# Patient Record
Sex: FEMALE | Race: OTHER | Employment: UNEMPLOYED
[De-identification: names, ages, dates, MRNs, and addresses within clinical notes are randomized per-mention and may not be internally consistent; named-entity substitution may affect disease eponyms.]

---

## 2022-11-18 LAB — PAP SMEAR, EXTERNAL: NORMAL

## 2024-11-14 LAB
ANTIBODY: NONREACTIVE
CHOLESTEROL, TOTAL: 159 MG/DL
CHOLESTEROL/HDL RATIO: 3.9
HDLC SERPL-MCNC: 41 MG/DL (ref 35–70)
HIV 1+2 AB+HIV1P24 AG SERPLBLD IA.RAPID: NEGATIVE
LDL CHOLESTEROL: 64
NONHDLC SERPL-MCNC: 118 MG/DL
TRIGL SERPL-MCNC: 271 MG/DL
VLDLC SERPL CALC-MCNC: 54 MG/DL

## 2024-11-18 ENCOUNTER — OFFICE VISIT (OUTPATIENT)
Dept: PRIMARY CARE CLINIC | Facility: CLINIC | Age: 44
End: 2024-11-18
Payer: MEDICAID

## 2024-11-18 VITALS
DIASTOLIC BLOOD PRESSURE: 68 MMHG | HEIGHT: 65 IN | WEIGHT: 232 LBS | TEMPERATURE: 97.6 F | HEART RATE: 60 BPM | OXYGEN SATURATION: 97 % | BODY MASS INDEX: 38.65 KG/M2 | RESPIRATION RATE: 16 BRPM | SYSTOLIC BLOOD PRESSURE: 102 MMHG

## 2024-11-18 DIAGNOSIS — R53.83 FATIGUE DUE TO SLEEP PATTERN DISTURBANCE: ICD-10-CM

## 2024-11-18 DIAGNOSIS — G47.9 FATIGUE DUE TO SLEEP PATTERN DISTURBANCE: ICD-10-CM

## 2024-11-18 DIAGNOSIS — E55.9 VITAMIN D DEFICIENCY: ICD-10-CM

## 2024-11-18 DIAGNOSIS — M79.605 PAIN OF LEFT LOWER EXTREMITY: ICD-10-CM

## 2024-11-18 DIAGNOSIS — Z76.89 ENCOUNTER TO ESTABLISH CARE: Primary | ICD-10-CM

## 2024-11-18 DIAGNOSIS — R73.03 PREDIABETES: ICD-10-CM

## 2024-11-18 DIAGNOSIS — I82.402 DEEP VEIN THROMBOSIS (DVT) OF LEFT LOWER EXTREMITY, UNSPECIFIED CHRONICITY, UNSPECIFIED VEIN (HCC): ICD-10-CM

## 2024-11-18 DIAGNOSIS — K59.00 CONSTIPATION, UNSPECIFIED CONSTIPATION TYPE: ICD-10-CM

## 2024-11-18 DIAGNOSIS — E78.1 HYPERTRIGLYCERIDEMIA: ICD-10-CM

## 2024-11-18 LAB
25(OH)D3 SERPL-MCNC: 23.3 NG/ML (ref 30–100)
ALBUMIN SERPL-MCNC: 4 G/DL (ref 3.5–5)
ALBUMIN/GLOB SERPL: 1.2 (ref 1–1.9)
ALP SERPL-CCNC: 94 U/L (ref 35–104)
ALT SERPL-CCNC: 19 U/L (ref 8–45)
AST SERPL-CCNC: 22 U/L (ref 15–37)
BILIRUB DIRECT SERPL-MCNC: <0.2 MG/DL (ref 0–0.4)
BILIRUB SERPL-MCNC: <0.2 MG/DL (ref 0–1.2)
EST. AVERAGE GLUCOSE BLD GHB EST-MCNC: 118 MG/DL
GLOBULIN SER CALC-MCNC: 3.4 G/DL (ref 2.3–3.5)
HBA1C MFR BLD: 5.8 % (ref 0–5.6)
MAGNESIUM SERPL-MCNC: 2 MG/DL (ref 1.8–2.4)
PROT SERPL-MCNC: 7.3 G/DL (ref 6.3–8.2)

## 2024-11-18 PROCEDURE — 99205 OFFICE O/P NEW HI 60 MIN: CPT

## 2024-11-18 RX ORDER — PSYLLIUM HUSK 100 %
15 POWDER (GRAM) MISCELLANEOUS 2 TIMES DAILY
Qty: 900 G | Refills: 5 | Status: SHIPPED | OUTPATIENT
Start: 2024-11-18 | End: 2024-12-18

## 2024-11-18 RX ORDER — FENOFIBRATE 145 MG/1
145 TABLET, COATED ORAL DAILY
Qty: 30 TABLET | Refills: 11 | Status: SHIPPED | OUTPATIENT
Start: 2024-11-18

## 2024-11-18 SDOH — ECONOMIC STABILITY: INCOME INSECURITY: HOW HARD IS IT FOR YOU TO PAY FOR THE VERY BASICS LIKE FOOD, HOUSING, MEDICAL CARE, AND HEATING?: NOT VERY HARD

## 2024-11-18 SDOH — ECONOMIC STABILITY: FOOD INSECURITY: WITHIN THE PAST 12 MONTHS, YOU WORRIED THAT YOUR FOOD WOULD RUN OUT BEFORE YOU GOT MONEY TO BUY MORE.: NEVER TRUE

## 2024-11-18 SDOH — ECONOMIC STABILITY: FOOD INSECURITY: WITHIN THE PAST 12 MONTHS, THE FOOD YOU BOUGHT JUST DIDN'T LAST AND YOU DIDN'T HAVE MONEY TO GET MORE.: NEVER TRUE

## 2024-11-18 ASSESSMENT — ENCOUNTER SYMPTOMS
SHORTNESS OF BREATH: 0
VOMITING: 0
BLOOD IN STOOL: 0
CHEST TIGHTNESS: 0
DIARRHEA: 0
COUGH: 0
NAUSEA: 0

## 2024-11-18 ASSESSMENT — PATIENT HEALTH QUESTIONNAIRE - PHQ9
SUM OF ALL RESPONSES TO PHQ QUESTIONS 1-9: 0
2. FEELING DOWN, DEPRESSED OR HOPELESS: NOT AT ALL
SUM OF ALL RESPONSES TO PHQ QUESTIONS 1-9: 0
1. LITTLE INTEREST OR PLEASURE IN DOING THINGS: NOT AT ALL
SUM OF ALL RESPONSES TO PHQ QUESTIONS 1-9: 0
SUM OF ALL RESPONSES TO PHQ9 QUESTIONS 1 & 2: 0
SUM OF ALL RESPONSES TO PHQ QUESTIONS 1-9: 0

## 2024-11-18 NOTE — PROGRESS NOTES
tablet, Rfl: 2    fenofibrate (TRICOR) 145 MG tablet, Take 1 tablet by mouth daily Take with food, Disp: 30 tablet, Rfl: 11    ALLERGIES / INTOLERANCES    No Known Allergies    REVIEW OF SYSTEMS    Review of Systems   Constitutional:  Negative for diaphoresis, fever and unexpected weight change.   HENT: Negative.     Respiratory:  Negative for cough, chest tightness and shortness of breath.    Cardiovascular:  Negative for chest pain and leg swelling.   Gastrointestinal:  Positive for constipation. Negative for blood in stool, diarrhea, nausea and vomiting.   Genitourinary:  Negative for difficulty urinating and hematuria.   Musculoskeletal: Negative.    Skin: Negative.    Allergic/Immunologic: Negative.    Neurological:  Positive for headaches.   Hematological: Negative.    Psychiatric/Behavioral:  Positive for sleep disturbance.           PHYSICAL EXAMINATION    Vitals:    11/18/24 0955   BP: 102/68   Pulse: 60   Resp: 16   Temp: 97.6 °F (36.4 °C)   SpO2: 97%   Weight: 105.2 kg (232 lb)   Height: 1.638 m (5' 4.5\")        Physical Exam  Vitals reviewed.   Constitutional:       General: She is not in acute distress.     Appearance: Normal appearance. She is obese. She is not ill-appearing or toxic-appearing.   HENT:      Head: Normocephalic and atraumatic.      Right Ear: External ear normal.      Left Ear: External ear normal.   Eyes:      Pupils: Pupils are equal, round, and reactive to light.   Cardiovascular:      Rate and Rhythm: Normal rate and regular rhythm.      Pulses: Normal pulses.      Heart sounds: Normal heart sounds. No murmur heard.     No friction rub.   Pulmonary:      Effort: Pulmonary effort is normal. No respiratory distress.      Breath sounds: Normal breath sounds.   Chest:      Chest wall: No tenderness.   Abdominal:      General: Abdomen is flat.      Palpations: Abdomen is soft.      Tenderness: There is no abdominal tenderness. There is no right CVA tenderness, left CVA tenderness,

## 2024-11-21 ENCOUNTER — TELEPHONE (OUTPATIENT)
Dept: PRIMARY CARE CLINIC | Facility: CLINIC | Age: 44
End: 2024-11-21

## 2024-11-21 DIAGNOSIS — I82.402 DEEP VEIN THROMBOSIS (DVT) OF LEFT LOWER EXTREMITY, UNSPECIFIED CHRONICITY, UNSPECIFIED VEIN (HCC): Primary | ICD-10-CM

## 2024-11-21 PROBLEM — R53.83 OTHER FATIGUE: Status: ACTIVE | Noted: 2024-11-21

## 2024-11-21 PROBLEM — E78.1 HYPERTRIGLYCERIDEMIA: Status: ACTIVE | Noted: 2024-11-21

## 2024-11-21 PROBLEM — G47.9 FATIGUE DUE TO SLEEP PATTERN DISTURBANCE: Status: ACTIVE | Noted: 2024-11-21

## 2024-11-21 PROBLEM — M79.605 PAIN OF LEFT LOWER EXTREMITY: Status: ACTIVE | Noted: 2024-11-21

## 2024-11-21 PROBLEM — K59.00 CONSTIPATION: Status: ACTIVE | Noted: 2024-11-21

## 2024-11-21 PROBLEM — Z76.89 ENCOUNTER TO ESTABLISH CARE: Status: ACTIVE | Noted: 2024-11-21

## 2024-11-21 PROBLEM — R73.03 PREDIABETES: Status: ACTIVE | Noted: 2024-11-21

## 2024-11-21 PROBLEM — E55.9 VITAMIN D DEFICIENCY: Status: ACTIVE | Noted: 2024-11-21

## 2024-11-21 ASSESSMENT — ENCOUNTER SYMPTOMS
CONSTIPATION: 1
ALLERGIC/IMMUNOLOGIC NEGATIVE: 1

## 2024-11-21 NOTE — TELEPHONE ENCOUNTER
[employer] faxed a form over stating that edoxaban tosylate 60 MG TABS  needs a PA.      Can she use one of the drugs below or do I need to do a PA?    Preferred ones are     eliquis, pradaxa (capsules only), warfarin and  xarelto (tablets only)

## 2024-11-21 NOTE — ASSESSMENT & PLAN NOTE
Chronic, stable on psyllium supplement  - refill sent to pharmacy  - discussed diet  - encouraged to eat more than 1 meal a day & instead several small meals, even when no appetite, to help metabolism   - h/o diagnosis of IBS, per paper chart review after appointment  - encouraged hydration  - denies recent changes in Bms or blood in stool

## 2024-11-21 NOTE — ASSESSMENT & PLAN NOTE
Chronic over last year w/ LLE DVT & also BLE venous insufficiency  - US LLE ordered to assess DVT status  - compression stockings for long trips encouraged  - continue OAC & refer to hem/onc  - may need to refer to vascular pending US results  - will reassess at next visit 12/16/24

## 2024-11-21 NOTE — ASSESSMENT & PLAN NOTE
Chronic sleep disturbance over last 1 year  - states pain in legs. Has h/o chronic venous insufficiency and also LLE DVT currently on OAC  - also, on review of paper chart from [place], psychiatrist mentioned trauma-related sleep disturbance  - will reassess wether medication or counseling needed at next appointment  - vitamin D deficient on lab. Daily replacement order & patient notified of results.

## 2024-11-21 NOTE — ASSESSMENT & PLAN NOTE
Chronic, stable  - adjusting to eating in a different country, as just moved from [place]  - exercise & hydration encouraged  - appears to be a healthy eater in terms of what foods she eats, just not in terms of frequency

## 2024-11-21 NOTE — ASSESSMENT & PLAN NOTE
Unsure on chronicity, but unstable  - noted on lab 11/18/24  - replacement ordered vit d3 1000 units qd.   - will reassess at next visit

## 2024-11-21 NOTE — ASSESSMENT & PLAN NOTE
Unsure on chronicity, but noted on labs 11/18/24 A1c 5.8 & unmanaged  - discussed lifestyle modification during appt & via MA during result notification on phone  - will discuss metformin as an option at next appt 12/16/24

## 2024-11-21 NOTE — ASSESSMENT & PLAN NOTE
Chronic, recurrent  - provoked DVT LLE pregnancy 2000  - unprovoked/ unidentified DVT LLE late 2023  - currently on Lixiana (Edoxaban) 60 mg qd, endorses compliance  - only 3 pills left- sent prescription to pharmacy. Will likely have to do PA.   - still waking up with LLE pain in the night, could also be related to chronic venous insufficiency  - ordered US LLE to assess current status. She had an US prior to leaving [facility] that was negative, but then had long travels here. She was given compression stockings for long journeys prior to departure.   - there were extensive lab studies performed for hypercoagulable state 11/5/24 in [place], but the labs and interpretation were in [place]i and, though it states results negative, some labs stated still pending results. Thus, I will refer to hem/onc.

## 2024-11-21 NOTE — ASSESSMENT & PLAN NOTE
Unsure on chronicity, but uncontrolled & noted on labs completed at [facility] 11/14/24  - fenofibrate orders  - repeat labs in 1 year  - diet discussed, but again, already eating pretty healthy based on our conversation  - exercise encouraged

## 2024-11-21 NOTE — ASSESSMENT & PLAN NOTE
Pleasant new patient accompanied by volunteer & AV interpretter  - h/o h. Pylori infxn, 3 c sections, dvt x 2 on oac, insomnia with multiple factors & IBS-C  - diagnosed hypertriglyceridemia, vit d deficiency, & prediabetes at our appt  - did not have majority of her medical history available (paper) due to her recent move from [place] as a refugee until after appt, but did help provide a sargent clinical picture  - relevant referrals sent  - see each problem a/p for further, but we will f/u 12/16/24 or sooner for acute care needs  - hopefully at next appt we will have her mychart up and running

## 2024-11-22 DIAGNOSIS — I82.402 DEEP VEIN THROMBOSIS (DVT) OF LEFT LOWER EXTREMITY, UNSPECIFIED CHRONICITY, UNSPECIFIED VEIN (HCC): ICD-10-CM

## 2024-11-22 NOTE — TELEPHONE ENCOUNTER
[facility] in [place] [facility] out [facility] [place].    [facility] in [place] told me to call back around lunch time.  They are still working on finding out how much eliquis will cost for the pt

## 2024-11-22 NOTE — TELEPHONE ENCOUNTER
Pharmacist at [facility] said that she can pick it up today. It's for a 30 day supply #60 no refill and she doesn't have to pay for it

## 2024-11-22 NOTE — TELEPHONE ENCOUNTER
Disp Refills Start End    edoxaban tosylate 60 MG TABS            I called [facility] [phone]   to see how much the eliquis rx was going to be and I was told that the pharamacist can't run it until 12/14/24 to tell me how much it will cost the pt.    Ill call [facility] [phone]  and find out how much the apixaban (ELIQUIS) 5 MG  rx cost her.

## 2024-11-22 NOTE — TELEPHONE ENCOUNTER
[facility] told me to call back at 10:30.  The system said that eliquis was filled at a different pharmacy but she isn't sure

## 2024-12-10 NOTE — PROGRESS NOTES
NEW PATIENT INTAKE    Referral Diagnosis: Deep vein thrombosis (DVT) of left lower extremity, unspecified chronicity, unspecified vein    Referring Provider:  Hallie Crockett PA    Primary Care Provider: Hallie Crockett PA    Family History of Cancer/ Hematology Disorders: None reported     Presenting Symptoms:  LLE pain     Chronological History of Pertinent Events:  Ms. Stephy Cifuentes is a 44-year-old  female referred for DVT of LLE. She is a recent refugee from [place].     11/21/24: Pt established care at [facility] [address] She reports a history of provoked LLE DVT during pregnancy in 2000 and an unprovoked/unidentified DVT LLE in late 2023. She is currently on Lixiana (Edoxaban) 60 mg daily and she endorses compliance.   -Provider notes, “still waking up with LLE pain in the night, could also be related to chronic venous insufficiency. Ordered US LLE to assess current status. She had an US prior to leaving [facility] that was negative, but then had long travels here. She was given compression stockings for long journeys prior to departure. There were extensive lab studies performed for hypercoagulable state 11/5/24 in [place], but the labs and interpretation were in [place] and, though it states results negative, some labs stated still pending results. Thus, I will refer to hem/onc.”  -Referred to [facility]    Notes from Referring Provider: None    Presented at Tumor Board: N/A    Other Pertinent Information: None

## 2024-12-12 ENCOUNTER — HOSPITAL ENCOUNTER (OUTPATIENT)
Dept: LAB | Age: 44
Discharge: HOME | End: 2024-12-12
Payer: MEDICAID

## 2024-12-12 ENCOUNTER — OFFICE VISIT (OUTPATIENT)
Dept: ONCOLOGY | Age: 44
End: 2024-12-12
Payer: MEDICAID

## 2024-12-12 VITALS
TEMPERATURE: 98.2 F | BODY MASS INDEX: 39.44 KG/M2 | HEIGHT: 64 IN | HEART RATE: 61 BPM | RESPIRATION RATE: 14 BRPM | WEIGHT: 231 LBS | OXYGEN SATURATION: 99 % | SYSTOLIC BLOOD PRESSURE: 104 MMHG | DIASTOLIC BLOOD PRESSURE: 63 MMHG

## 2024-12-12 DIAGNOSIS — I82.5Y2 CHRONIC DEEP VEIN THROMBOSIS (DVT) OF PROXIMAL VEIN OF LEFT LOWER EXTREMITY (HCC): Primary | ICD-10-CM

## 2024-12-12 DIAGNOSIS — I82.5Y2 CHRONIC DEEP VEIN THROMBOSIS (DVT) OF PROXIMAL VEIN OF LEFT LOWER EXTREMITY (HCC): ICD-10-CM

## 2024-12-12 LAB — D DIMER PPP FEU-MCNC: <0.27 UG/ML(FEU)

## 2024-12-12 PROCEDURE — 86147 CARDIOLIPIN ANTIBODY EA IG: CPT

## 2024-12-12 PROCEDURE — 36415 COLL VENOUS BLD VENIPUNCTURE: CPT

## 2024-12-12 PROCEDURE — 99243 OFF/OP CNSLTJ NEW/EST LOW 30: CPT | Performed by: INTERNAL MEDICINE

## 2024-12-12 PROCEDURE — 86146 BETA-2 GLYCOPROTEIN ANTIBODY: CPT

## 2024-12-12 PROCEDURE — 85379 FIBRIN DEGRADATION QUANT: CPT

## 2024-12-12 PROCEDURE — 81240 F2 GENE: CPT

## 2024-12-12 PROCEDURE — 81241 F5 GENE: CPT

## 2024-12-12 ASSESSMENT — PATIENT HEALTH QUESTIONNAIRE - PHQ9
SUM OF ALL RESPONSES TO PHQ9 QUESTIONS 1 & 2: 0
1. LITTLE INTEREST OR PLEASURE IN DOING THINGS: NOT AT ALL
SUM OF ALL RESPONSES TO PHQ QUESTIONS 1-9: 0
2. FEELING DOWN, DEPRESSED OR HOPELESS: NOT AT ALL

## 2024-12-12 NOTE — PATIENT INSTRUCTIONS
Patient Information from Today's Visit    Diagnosis: DVT      Follow Up Instructions: 2 months      Treatment Summary has been discussed and given to patient: N/A      Current Labs: Labs to be drawn today            Please refer to After Visit Summary or [email or web address] for upcoming appointment information. If you have any questions regarding your upcoming schedule please reach out to your care team through [facility] or call [phone].    Please notify your assigned Nurse Navigator of any unplanned hospital admissions or Emergency Room visits within 24 hours of discharge.    -------------------------------------------------------------------------------------------------------------------  Please call our office at [phone] if you have any  of the following symptoms:   Fever of 100.5 or greater  Chills  Shortness of breath  Swelling or pain in one leg    After office hours an answering service is available and will contact a provider for emergencies or if you are experiencing any of the above symptoms.        LAURA SOLIS MA

## 2024-12-12 NOTE — PROGRESS NOTES
[facility]  [address] [place] [address]  Bryan Partida MD    Patient Name Jacy Mak   MRN [id number]   Date 24     Hematology/Oncology Diagnosis     Recurrent DVT of LLE    History of Present Illness  Jacy Mak is a 44 y.o. lady who presents to establish care.    -   Provoked LLE DVT during pregnancy  -   Unprovoked/recurrent DVT of LLE while in [place], her home country.  - 24  Established with LONI Garrido.      Was a volunteer from [employer] to establish care today.  Translation services used for the interview.  Overall, feels well.  Works full-time.  Mild, intermittent swelling of her left lower extremity.  First experience of thrombosis in  while she was pregnant.  Then, unprovoked thrombosis in the latter part of .  We have no records to review today however she tells me that she was started on anticoagulation while living in [place].  No family history of thrombosis.    She is currently tolerating apixaban quite well with no unusual bleeding or bruising.  She is establishing care with OB/GYN early next year and working on getting her Social Security number.    ROS   12 point review of system negative except as noted in HPI    Past Medical History:   Diagnosis Date    H. pylori infection     diagnosed through an endoscopy    Insomnia     Thrombosis     left leg 2 episodes 2000        Past Surgical History:   Procedure Laterality Date     SECTION          Social History     Socioeconomic History    Marital status: Legally      Spouse name: Not on file    Number of children: Not on file    Years of education: Not on file    Highest education level: Not on file   Occupational History    Not on file   Tobacco Use    Smoking status: Never    Smokeless tobacco: Never   Vaping Use    Vaping status: Never Used   Substance and Sexual Activity    Alcohol use: Not Currently

## 2024-12-14 LAB
B2 GLYCOPROT1 IGG SER-ACNC: <9 GPI IGG UNITS (ref 0–20)
B2 GLYCOPROT1 IGM SER-ACNC: <9 GPI IGM UNITS (ref 0–32)
CARDIOLIPIN IGG SER IA-ACNC: <9 GPL U/ML (ref 0–14)
CARDIOLIPIN IGM SER IA-ACNC: 13 MPL U/ML (ref 0–12)

## 2024-12-16 ENCOUNTER — OFFICE VISIT (OUTPATIENT)
Dept: PRIMARY CARE CLINIC | Facility: CLINIC | Age: 44
End: 2024-12-16

## 2024-12-16 VITALS
SYSTOLIC BLOOD PRESSURE: 108 MMHG | BODY MASS INDEX: 39.61 KG/M2 | WEIGHT: 232 LBS | RESPIRATION RATE: 16 BRPM | TEMPERATURE: 97.6 F | HEIGHT: 64 IN | OXYGEN SATURATION: 97 % | DIASTOLIC BLOOD PRESSURE: 68 MMHG | HEART RATE: 67 BPM

## 2024-12-16 DIAGNOSIS — E55.9 VITAMIN D DEFICIENCY: ICD-10-CM

## 2024-12-16 DIAGNOSIS — Z92.29 HAS RECEIVED FIRST DOSE OF HEPATITIS B VACCINE: ICD-10-CM

## 2024-12-16 DIAGNOSIS — F51.01 PRIMARY INSOMNIA: ICD-10-CM

## 2024-12-16 DIAGNOSIS — K21.9 GASTROESOPHAGEAL REFLUX DISEASE, UNSPECIFIED WHETHER ESOPHAGITIS PRESENT: ICD-10-CM

## 2024-12-16 DIAGNOSIS — F41.9 ANXIETY: ICD-10-CM

## 2024-12-16 DIAGNOSIS — Z12.83 SCREENING FOR SKIN CANCER: ICD-10-CM

## 2024-12-16 DIAGNOSIS — R07.9 CHEST PAIN, UNSPECIFIED TYPE: Primary | ICD-10-CM

## 2024-12-16 RX ORDER — BUSPIRONE HYDROCHLORIDE 5 MG/1
5 TABLET ORAL 3 TIMES DAILY
Qty: 90 TABLET | Refills: 0 | Status: SHIPPED | OUTPATIENT
Start: 2024-12-16 | End: 2025-01-15

## 2024-12-16 RX ORDER — TRAZODONE HYDROCHLORIDE 50 MG/1
50 TABLET, FILM COATED ORAL NIGHTLY PRN
Qty: 90 TABLET | Refills: 1 | Status: SHIPPED | OUTPATIENT
Start: 2024-12-16

## 2024-12-16 ASSESSMENT — ENCOUNTER SYMPTOMS
COUGH: 0
NAUSEA: 0
SHORTNESS OF BREATH: 0
DIARRHEA: 0
CHEST TIGHTNESS: 0
VOMITING: 0
BLOOD IN STOOL: 0

## 2024-12-16 NOTE — PROGRESS NOTES
[facility]  Hallie \"Miguelina\" LACHO Crockett  [address], [place] [address]  [phone]         ASSESSMENT AND PLAN    Problem List Items Addressed This Visit       Vitamin D deficiency     Chronic, hopefully improving on replacement  - noted on lab 11/18/24  - started on vitamin dD3 1000 units daily. Has been compliant. Refill sent.   - will recheck lab at next appt  - f/u routine 5/12/2025 with me or sooner for acute care needs  - vitamin D food info provided in AVS           Relevant Medications    vitamin D (CHOLECALCIFEROL) 25 MCG (1000 UT) TABS tablet    Anxiety     Chronic, worsening recently in light of many life changes  - also triggered by not eating due to fear of acid reflux flare. See plan for GERD management.   - buspar prn up to tid   - trazodone 50 mg qd. Advised not to take buspar & trazodone at same time.   - no referral to counseling at this time  - f/u routine 5/12/2025 with me or sooner for acute care needs  - continuing to take vitamin D should also hopefully help           Relevant Medications    busPIRone (BUSPAR) 5 MG tablet    traZODone (DESYREL) 50 MG tablet    Chest pain - Primary     Acute onset last weekend, resolved on own. see note for details  - in setting of worsening anxiety & GERD  - EKG in office unremarkable  - treat anxiety & GERD  - return if symptoms acutely worsen or continue to persist w/o improvement past treatment  - f/u routine 5/12/2025 with me or sooner for acute care needs           Relevant Orders    EKG 12 Lead (Completed)    Gastroesophageal reflux disease     Chronic, unmanaged  - omeprazole script for 20 mg once daily before breakfast prescribed. Advised to increase to twice daily if symptoms didn't improve after 1-2 weeks.   - GERD info provided in AVS & discussed during appt  - return if symptoms acutely worsen or continue to persist w/o improvement past treatment           Relevant Medications    omeprazole

## 2024-12-16 NOTE — PATIENT INSTRUCTIONS
IT WAS GREAT TO SEE YOU AGAIN TODAY!    PLEASE TAKE ALL MEDICATION AS DISCUSSED.   - FOR ANXIETY, TAKE BUSPAR 5 MG THREE TIMES A DAY. CAN ALSO TAKE JUST AS NEEDED, IF TAKING IT THREE TIMES A DAY IS TOO MUCH FOR YOU.   - FOR REFLUX, TAKE OMEPRAZOLE (PRILOSEC) 20 MG BEFORE BREAKFAST DAILY. MAY INCREASE TO TWICE DAILY, IF SYMPTOMS DO NOT IMPROVE AFTER 1-2 WEEKS.   - FOR LOW VITAMIN D, PLEASE TAKE VITAMIN D3 (CHOLECALCIFEROL) 1000 UNITS DAILY.   - FOR HISTORY OF BLOOD CLOTS, PLEASE CONTINUE ELIQUIS 5 MG TWICE A DAY.   - FOR INSOMNIA, TAKE TRAZODONE (DESERYL) 50 MG AS NEEDED AT BEDTIME FOR SLEEP. CAN 1/2 THE PILL, IF 50 MG IS TOO MUCH.   - CONTINUE FENOFIBRATE & PSYLLIUM AS NEEDED.   - SENT HEPATITIS B VACCINE TO PHARMACY. PLEASE GO TO GET YOUR SECOND DOSE.     DRINK LOTS OF WATER. WEAR YOUR SEATBELT.     WE WILL SEE YOU AGAIN AT YOUR NEXT APPOINTMENT WITH ME IN 5 MONTHS BUT PLEASE SEND [employer] MESSAGE OR CALL WITH CONCERNS [phone]

## 2024-12-17 ENCOUNTER — HOSPITAL ENCOUNTER (OUTPATIENT)
Dept: ULTRASOUND IMAGING | Age: 44
Discharge: HOME | End: 2024-12-20
Payer: MEDICAID

## 2024-12-17 DIAGNOSIS — I82.402 DEEP VEIN THROMBOSIS (DVT) OF LEFT LOWER EXTREMITY, UNSPECIFIED CHRONICITY, UNSPECIFIED VEIN (HCC): ICD-10-CM

## 2024-12-17 LAB
F5 P.R506Q BLD/T QL: NORMAL
IMP & REVIEW OF LAB RESULTS: NORMAL

## 2024-12-17 PROCEDURE — 93971 EXTREMITY STUDY: CPT

## 2024-12-18 PROBLEM — R07.9 CHEST PAIN: Status: ACTIVE | Noted: 2024-12-18

## 2024-12-18 PROBLEM — F51.01 PRIMARY INSOMNIA: Status: ACTIVE | Noted: 2024-12-18

## 2024-12-18 PROBLEM — F41.9 ANXIETY: Status: ACTIVE | Noted: 2024-12-18

## 2024-12-18 PROBLEM — Z92.29 HAS RECEIVED FIRST DOSE OF HEPATITIS B VACCINE: Status: ACTIVE | Noted: 2024-12-18

## 2024-12-18 PROBLEM — Z12.83 SCREENING FOR SKIN CANCER: Status: ACTIVE | Noted: 2024-12-18

## 2024-12-18 PROBLEM — K21.9 GASTROESOPHAGEAL REFLUX DISEASE: Status: ACTIVE | Noted: 2024-12-18

## 2024-12-18 LAB
F2 GENE MUT ANL BLD/T: ABNORMAL
IMP & REVIEW OF LAB RESULTS: ABNORMAL

## 2024-12-18 PROCEDURE — 93971 EXTREMITY STUDY: CPT | Performed by: RADIOLOGY

## 2024-12-18 NOTE — ASSESSMENT & PLAN NOTE
Acute onset last weekend, resolved on own. see note for details  - in setting of worsening anxiety & GERD  - EKG in office unremarkable  - treat anxiety & GERD  - return if symptoms acutely worsen or continue to persist w/o improvement past treatment  - f/u routine 5/12/2025 with me or sooner for acute care needs

## 2024-12-18 NOTE — ASSESSMENT & PLAN NOTE
Chronic, worsening recently in light of many life changes  - also triggered by not eating due to fear of acid reflux flare. See plan for GERD management.   - buspar prn up to tid   - trazodone 50 mg qd. Advised not to take buspar & trazodone at same time.   - no referral to counseling at this time  - f/u routine 5/12/2025 with me or sooner for acute care needs  - continuing to take vitamin D should also hopefully help

## 2024-12-18 NOTE — ASSESSMENT & PLAN NOTE
Chronic, unmanaged  - omeprazole script for 20 mg once daily before breakfast prescribed. Advised to increase to twice daily if symptoms didn't improve after 1-2 weeks.   - GERD info provided in AVS & discussed during appt  - return if symptoms acutely worsen or continue to persist w/o improvement past treatment

## 2024-12-18 NOTE — ASSESSMENT & PLAN NOTE
Chronic, hopefully improving on replacement  - noted on lab 11/18/24  - started on vitamin dD3 1000 units daily. Has been compliant. Refill sent.   - will recheck lab at next appt  - - f/u routine 5/12/2025 with me or sooner for acute care needs

## 2025-01-04 ENCOUNTER — HOSPITAL ENCOUNTER (EMERGENCY)
Age: 45
Discharge: HOME | End: 2025-01-04
Attending: EMERGENCY MEDICINE
Payer: MEDICAID

## 2025-01-04 VITALS
SYSTOLIC BLOOD PRESSURE: 115 MMHG | TEMPERATURE: 97.8 F | HEIGHT: 64 IN | HEART RATE: 87 BPM | BODY MASS INDEX: 39.61 KG/M2 | WEIGHT: 232 LBS | OXYGEN SATURATION: 98 % | DIASTOLIC BLOOD PRESSURE: 81 MMHG | RESPIRATION RATE: 18 BRPM

## 2025-01-04 DIAGNOSIS — S61.411A LACERATION OF RIGHT HAND WITHOUT FOREIGN BODY, INITIAL ENCOUNTER: Primary | ICD-10-CM

## 2025-01-04 PROCEDURE — 6370000000 HC RX 637 (ALT 250 FOR IP): Performed by: EMERGENCY MEDICINE

## 2025-01-04 PROCEDURE — 90471 IMMUNIZATION ADMIN: CPT | Performed by: EMERGENCY MEDICINE

## 2025-01-04 PROCEDURE — 12001 RPR S/N/AX/GEN/TRNK 2.5CM/<: CPT

## 2025-01-04 PROCEDURE — 90715 TDAP VACCINE 7 YRS/> IM: CPT | Performed by: EMERGENCY MEDICINE

## 2025-01-04 PROCEDURE — 99284 EMERGENCY DEPT VISIT MOD MDM: CPT

## 2025-01-04 PROCEDURE — 6360000002 HC RX W HCPCS: Performed by: EMERGENCY MEDICINE

## 2025-01-04 PROCEDURE — 12041 INTMD RPR N-HF/GENIT 2.5CM/<: CPT

## 2025-01-04 RX ORDER — ACETAMINOPHEN 325 MG/1
650 TABLET ORAL
Status: COMPLETED | OUTPATIENT
Start: 2025-01-04 | End: 2025-01-04

## 2025-01-04 RX ADMIN — ACETAMINOPHEN 650 MG: 325 TABLET, FILM COATED ORAL at 23:53

## 2025-01-04 RX ADMIN — TETANUS TOXOID, REDUCED DIPHTHERIA TOXOID AND ACELLULAR PERTUSSIS VACCINE, ADSORBED 0.5 ML: 5; 2.5; 8; 8; 2.5 SUSPENSION INTRAMUSCULAR at 23:53

## 2025-01-04 ASSESSMENT — LIFESTYLE VARIABLES
HOW OFTEN DO YOU HAVE A DRINK CONTAINING ALCOHOL: NEVER
HOW MANY STANDARD DRINKS CONTAINING ALCOHOL DO YOU HAVE ON A TYPICAL DAY: PATIENT DOES NOT DRINK

## 2025-01-04 ASSESSMENT — PAIN SCALES - GENERAL: PAINLEVEL_OUTOF10: 7

## 2025-01-05 NOTE — ED TRIAGE NOTES
Pt injured her right hand at work. Small but deep laceration to the top of her knuckles. Bleeding controlled.

## 2025-01-05 NOTE — ED PROVIDER NOTES
MG TABS TABLET    Take 1 tablet by mouth 2 times daily    BUSPIRONE (BUSPAR) 5 MG TABLET    Take 1 tablet by mouth 3 times daily    FENOFIBRATE (TRICOR) 145 MG TABLET    Take 1 tablet by mouth daily Take with food    OMEPRAZOLE (PRILOSEC) 20 MG DELAYED RELEASE CAPSULE    Take 1 capsule by mouth every morning (before breakfast)    PSYLLIUM HUSK POWD    15 g by Does not apply route in the morning and at bedtime    TRAZODONE (DESYREL) 50 MG TABLET    Take 1 tablet by mouth nightly as needed for Sleep    VITAMIN D (CHOLECALCIFEROL) 25 MCG (1000 UT) TABS TABLET    Take 1 tablet by mouth daily        Results from this emergency department visit:      No results found for any visits on 01/04/25.      No orders to display                No results for input(s): \"COVID19\" in the last 72 hours.     Voice dictation software was used during the making of this note.  This software is not perfect and grammatical and other typographical errors may be present.  This note has not been completely proofread for errors.       Philip Moore MD  01/04/25 1931

## 2025-01-05 NOTE — FLOWSHEET NOTE
Patient mobility status  with no difficulty.     I have reviewed discharge instructions with the patient.  The patient verbalized understanding.    Patient left ED via Discharge Method: ambulatory to Home with Friend.    Opportunity for questions and clarification provided.     Patient given 0 scripts.

## 2025-01-17 PROBLEM — Z12.83 SCREENING FOR SKIN CANCER: Status: RESOLVED | Noted: 2024-12-18 | Resolved: 2025-01-17

## 2025-02-13 ENCOUNTER — OFFICE VISIT (OUTPATIENT)
Dept: ONCOLOGY | Age: 45
End: 2025-02-13
Payer: MEDICAID

## 2025-02-13 VITALS
HEART RATE: 58 BPM | WEIGHT: 228 LBS | DIASTOLIC BLOOD PRESSURE: 70 MMHG | TEMPERATURE: 98 F | OXYGEN SATURATION: 97 % | SYSTOLIC BLOOD PRESSURE: 99 MMHG | BODY MASS INDEX: 38.93 KG/M2 | RESPIRATION RATE: 12 BRPM | HEIGHT: 64 IN

## 2025-02-13 DIAGNOSIS — I82.5Y2 CHRONIC DEEP VEIN THROMBOSIS (DVT) OF PROXIMAL VEIN OF LEFT LOWER EXTREMITY (HCC): Primary | ICD-10-CM

## 2025-02-13 PROCEDURE — 99213 OFFICE O/P EST LOW 20 MIN: CPT | Performed by: INTERNAL MEDICINE

## 2025-02-13 ASSESSMENT — PATIENT HEALTH QUESTIONNAIRE - PHQ9
3. TROUBLE FALLING OR STAYING ASLEEP: SEVERAL DAYS
9. THOUGHTS THAT YOU WOULD BE BETTER OFF DEAD, OR OF HURTING YOURSELF: NOT AT ALL
10. IF YOU CHECKED OFF ANY PROBLEMS, HOW DIFFICULT HAVE THESE PROBLEMS MADE IT FOR YOU TO DO YOUR WORK, TAKE CARE OF THINGS AT HOME, OR GET ALONG WITH OTHER PEOPLE: VERY DIFFICULT
SUM OF ALL RESPONSES TO PHQ QUESTIONS 1-9: 15
6. FEELING BAD ABOUT YOURSELF - OR THAT YOU ARE A FAILURE OR HAVE LET YOURSELF OR YOUR FAMILY DOWN: NOT AT ALL
4. FEELING TIRED OR HAVING LITTLE ENERGY: MORE THAN HALF THE DAYS
SUM OF ALL RESPONSES TO PHQ QUESTIONS 1-9: 15
SUM OF ALL RESPONSES TO PHQ9 QUESTIONS 1 & 2: 6
SUM OF ALL RESPONSES TO PHQ QUESTIONS 1-9: 15
5. POOR APPETITE OR OVEREATING: NEARLY EVERY DAY
7. TROUBLE CONCENTRATING ON THINGS, SUCH AS READING THE NEWSPAPER OR WATCHING TELEVISION: NEARLY EVERY DAY
1. LITTLE INTEREST OR PLEASURE IN DOING THINGS: NEARLY EVERY DAY
8. MOVING OR SPEAKING SO SLOWLY THAT OTHER PEOPLE COULD HAVE NOTICED. OR THE OPPOSITE, BEING SO FIGETY OR RESTLESS THAT YOU HAVE BEEN MOVING AROUND A LOT MORE THAN USUAL: NOT AT ALL
2. FEELING DOWN, DEPRESSED OR HOPELESS: NEARLY EVERY DAY
SUM OF ALL RESPONSES TO PHQ QUESTIONS 1-9: 15

## 2025-02-13 NOTE — PROGRESS NOTES
Resource Strain (CARDIA)     Difficulty of Paying Living Expenses: Not very hard   Food Insecurity: No Food Insecurity (11/18/2024)    Hunger Vital Sign     Worried About Running Out of Food in the Last Year: Never true     Ran Out of Food in the Last Year: Never true   Transportation Needs: Unknown (11/18/2024)    PRAPARE - Transportation     Lack of Transportation (Medical): Not on file     Lack of Transportation (Non-Medical): No   Physical Activity: Insufficiently Active (11/14/2024)    Received from [facility]    Physical Activity     Days of Exercise per Week: 3     Minutes of Exercise per Session: 30     Total Minutes of Exercise per Week: 90   Stress: Not on file   Social Connections: Not on file   Intimate Partner Violence: Not on file   Housing Stability: Unknown (11/18/2024)    Housing Stability Vital Sign     Unable to Pay for Housing in the Last Year: Not on file     Number of Times Moved in the Last Year: Not on file     Homeless in the Last Year: No        Current Outpatient Medications   Medication Sig Dispense Refill    apixaban (ELIQUIS) 2.5 MG TABS tablet Take 1 tablet by mouth 2 times daily 180 tablet 3    omeprazole (PRILOSEC) 20 MG delayed release capsule Take 1 capsule by mouth every morning (before breakfast) 90 capsule 0    vitamin D (CHOLECALCIFEROL) 25 MCG (1000 UT) TABS tablet Take 1 tablet by mouth daily 90 tablet 1    traZODone (DESYREL) 50 MG tablet Take 1 tablet by mouth nightly as needed for Sleep 90 tablet 1    Psyllium Husk POWD 15 g by Does not apply route in the morning and at bedtime 900 g 5    fenofibrate (TRICOR) 145 MG tablet Take 1 tablet by mouth daily Take with food 30 tablet 11     No current facility-administered medications for this visit.       Physical Exam  ECOG - (0) Fully active, able to carry on all predisease performance without restriction  BP 99/70   Pulse 58   Temp 98 °F (36.7 °C)   Resp 12   Ht 1.626 m (5' 4\")   Wt 103.4 kg (228 lb)   SpO2 97%   BMI

## 2025-02-13 NOTE — PATIENT INSTRUCTIONS
Patient Information from Today's Visit    Diagnosis: DVT      Follow Up Instructions: 1 year follow-up    A new prescription for your Eliquis has been signed and sent to your pharmacy    Treatment Summary has been discussed and given to patient: N/A      Current Labs: N/A                Please refer to After Visit Summary or MyChart for upcoming appointment information. If you have any questions regarding your upcoming schedule please reach out to your care team through [facility] or call [phone].    Please notify your assigned Nurse Navigator of any unplanned hospital admissions or Emergency Room visits within 24 hours of discharge.    -------------------------------------------------------------------------------------------------------------------  Please call our office at [phone] if you have any  of the following symptoms:   Fever of 100.5 or greater  Chills  Shortness of breath  Swelling or pain in one leg    After office hours an answering service is available and will contact a provider for emergencies or if you are experiencing any of the above symptoms.        Soraya Ray RN

## 2025-02-23 PROBLEM — D68.52 PROTHROMBIN GENE MUTATION: Status: ACTIVE | Noted: 2025-02-23

## 2025-03-19 ENCOUNTER — OFFICE VISIT (OUTPATIENT)
Dept: OBGYN CLINIC | Age: 45
End: 2025-03-19
Payer: MEDICAID

## 2025-03-19 VITALS
SYSTOLIC BLOOD PRESSURE: 106 MMHG | BODY MASS INDEX: 39.44 KG/M2 | WEIGHT: 231 LBS | DIASTOLIC BLOOD PRESSURE: 70 MMHG | HEIGHT: 64 IN

## 2025-03-19 DIAGNOSIS — Z11.3 SCREEN FOR STD (SEXUALLY TRANSMITTED DISEASE): ICD-10-CM

## 2025-03-19 DIAGNOSIS — Z12.4 PAP SMEAR FOR CERVICAL CANCER SCREENING: ICD-10-CM

## 2025-03-19 DIAGNOSIS — Z01.419 WOMEN'S ANNUAL ROUTINE GYNECOLOGICAL EXAMINATION: Primary | ICD-10-CM

## 2025-03-19 DIAGNOSIS — Z12.31 SCREENING MAMMOGRAM FOR BREAST CANCER: ICD-10-CM

## 2025-03-19 LAB
HBV SURFACE AG SER QL: NONREACTIVE
HCV AB SER QL: NONREACTIVE
HIV 1+2 AB+HIV1 P24 AG SERPL QL IA: NONREACTIVE
HIV 1/2 RESULT COMMENT: NORMAL
T PALLIDUM AB SER QL IA: NONREACTIVE

## 2025-03-19 PROCEDURE — 99386 PREV VISIT NEW AGE 40-64: CPT | Performed by: NURSE PRACTITIONER

## 2025-03-19 NOTE — PROGRESS NOTES
Pt comes in today as new pt for AE.     : Jose Juan [id number]    LAST PAP:  11/18/2022 pt states negative     LAST MAMMO:  never     LMP:  Patient's last menstrual period was 03/09/2025 (exact date).    BIRTH CONTROL:  tubal     TOBACCO USE:  No    FAMILY HISTORY OF:   Breast Cancer:  No   Ovarian Cancer:  No   Uterine Cancer:  No   Colon Cancer:  No    Vitals:    03/19/25 1347   BP: 106/70   BP Site: Right Upper Arm   Patient Position: Sitting   Weight: 104.8 kg (231 lb)   Height: 1.626 m (5' 4\")        Yarely Clark MA  03/19/25  2:03 PM

## 2025-03-19 NOTE — PROGRESS NOTES
Jacy Mak is a 44 y.o.  who is here for AE. Hx DVT on anticoagulation        Patient's last menstrual period was 2025 (exact date).    Menses:Q month, lasting 3 days    Menstrual Complaints: none    Birth Control:BTL    Date of last Cervical Cancer screen (HPV or PAP): 2022 neg per pt    Hx abnormal pap or STD:denies    No breast cancer screening on file    Fam Hx of Breast, ovarian or uterine cancer:denies    Sexually Active:yes          ROS:    Breast: Denies pain, lump or nipple discharge    GYN: Denies pelvic pain, discharge, itching, odor or dysuria.     Constitutional: Negative for chills and fever.     HENT: Negative for severe headaches or vision changes    Respiratory: Negative for cough and shortness of breath.      Cardiovascular: Negative for chest pain and palpitations.     Gastrointestinal: Negative for nausea and vomiting. Negative for diarrhea and constipation    Genitourinary: Negative for dysuria and hematuria.         Past Medical History:   Diagnosis Date    H. pylori infection     diagnosed through an endoscopy    Insomnia     Thrombosis     left leg 2 episodes 2000       Past Surgical History:   Procedure Laterality Date     SECTION      TUBAL LIGATION         Family History   Problem Relation Age of Onset    No Known Problems Mother     Other Father         heart valve blockage       Social History     Socioeconomic History    Marital status: Legally      Spouse name: Not on file    Number of children: Not on file    Years of education: Not on file    Highest education level: Not on file   Occupational History    Not on file   Tobacco Use    Smoking status: Never    Smokeless tobacco: Never   Vaping Use    Vaping status: Never Used   Substance and Sexual Activity    Alcohol use: Not Currently    Drug use: Never    Sexual activity: Not Currently   Other Topics Concern    Not on file   Social History Narrative    Abuse: Feels safe

## 2025-03-19 NOTE — PATIENT INSTRUCTIONS
To Schedule a Mammogram or Breast Ultrasound/MRI contact [facility]  [address] [place] [address], [address]  [place] [address] [phone]

## 2025-03-19 NOTE — PROGRESS NOTES
Chaperone for Intimate Exam     Chaperone was offer accepted as part of the rooming process    Chaperone: Edel Gregorio

## 2025-03-20 ENCOUNTER — RESULTS FOLLOW-UP (OUTPATIENT)
Dept: OBGYN CLINIC | Age: 45
End: 2025-03-20

## 2025-03-20 DIAGNOSIS — R87.612 LGSIL ON PAP SMEAR OF CERVIX: Primary | ICD-10-CM

## 2025-03-20 NOTE — PROGRESS NOTES
I have reviewed the patient's visit today including history, exam and assessment by JERRY Rosario.  I agree with treatment/plan as above.    Bautista Denton MD  8:03 AM  03/20/25

## 2025-03-25 DIAGNOSIS — Z12.83 SCREENING FOR SKIN CANCER: Primary | ICD-10-CM

## 2025-03-28 LAB
C TRACH RRNA CVX QL NAA+PROBE: NEGATIVE
COLLECTION METHOD: ABNORMAL
CYTOLOGIST CVX/VAG CYTO: ABNORMAL
CYTOLOGY CVX/VAG DOC THIN PREP: ABNORMAL
DATE OF LMP: ABNORMAL
HPV APTIMA: POSITIVE
HPV GENOTYPE REFLEX: ABNORMAL
Lab: ABNORMAL
N GONORRHOEA RRNA CVX QL NAA+PROBE: NEGATIVE
PAP SOURCE: ABNORMAL
PATH REPORT.FINAL DX SPEC: ABNORMAL
PATHOLOGIST CVX/VAG CYTO: ABNORMAL
PATHOLOGIST PROVIDED ICD: ABNORMAL
STAT OF ADQ CVX/VAG CYTO-IMP: ABNORMAL
T VAGINALIS RRNA SPEC QL NAA+PROBE: NEGATIVE

## 2025-03-29 PROBLEM — R87.612 LGSIL ON PAP SMEAR OF CERVIX: Status: ACTIVE | Noted: 2025-03-29

## 2025-03-29 NOTE — TELEPHONE ENCOUNTER
Please let pt know pap LGSIL    Please schedule patient for colposcopy  Please send pt acog handout on abnormal pap.    To help your body clear the HPV infections we recommend the following  We encourage you to stop smoking (if you do)  Take a multivitamin each day  Eat more cruciferous vegetables (sweet potatoes, spinach, kale, papaya, oranges, sweet peppers,and tomatoes) and other things high in antioxidants like green tea, pomegranate, dark chocolate, etc.  This will boost your immune system and help with your abnormal pap smear.   Exercise 30minutes/day  To help prevent the spread of sexually transmitted diseases, condoms must be worn during sexual activity.   If you have not yet received the HPV vaccine and are under the age 45, we recommend you complete the series

## 2025-03-31 NOTE — TELEPHONE ENCOUNTER
:   Neymar  #[id number]    Called patient, updated her on pap smear result and explained IN-DEPTH what her pap smear means and why a colposcopy is recommended.   Reviewed recommendations per provider to help body clear HPV infections.    Patient verbalized understanding, denies questions at this time.     Scheduled colposcopy for 4/29/2025 at 11:30am, patient knows to come 15 minutes early to get appointment started.    Sent [employer] message with handouts and recommendations.     Patient will let us know at colpo visit if she wants gardasil.

## 2025-04-08 ENCOUNTER — APPOINTMENT (OUTPATIENT)
Dept: CT IMAGING | Age: 45
End: 2025-04-08
Payer: MEDICAID

## 2025-04-08 ENCOUNTER — HOSPITAL ENCOUNTER (EMERGENCY)
Age: 45
Discharge: HOME | End: 2025-04-08
Attending: EMERGENCY MEDICINE
Payer: MEDICAID

## 2025-04-08 ENCOUNTER — APPOINTMENT (OUTPATIENT)
Dept: GENERAL RADIOLOGY | Age: 45
End: 2025-04-08
Payer: MEDICAID

## 2025-04-08 VITALS
BODY MASS INDEX: 37.93 KG/M2 | RESPIRATION RATE: 27 BRPM | HEART RATE: 60 BPM | DIASTOLIC BLOOD PRESSURE: 69 MMHG | WEIGHT: 221 LBS | OXYGEN SATURATION: 96 % | TEMPERATURE: 98.3 F | SYSTOLIC BLOOD PRESSURE: 108 MMHG

## 2025-04-08 DIAGNOSIS — R07.89 CHEST WALL PAIN: ICD-10-CM

## 2025-04-08 DIAGNOSIS — J40 BRONCHITIS: Primary | ICD-10-CM

## 2025-04-08 LAB
ALBUMIN SERPL-MCNC: 4.3 G/DL (ref 3.5–5)
ALBUMIN/GLOB SERPL: 1.4 (ref 1–1.9)
ALP SERPL-CCNC: 118 U/L (ref 35–104)
ALT SERPL-CCNC: 17 U/L (ref 12–65)
ANION GAP SERPL CALC-SCNC: 14 MMOL/L (ref 7–16)
AST SERPL-CCNC: 18 U/L (ref 15–37)
BASOPHILS # BLD: 0.06 K/UL (ref 0–0.2)
BASOPHILS NFR BLD: 0.4 % (ref 0–2)
BILIRUB SERPL-MCNC: 0.2 MG/DL (ref 0–1.2)
BUN SERPL-MCNC: 8 MG/DL (ref 6–23)
CALCIUM SERPL-MCNC: 9.1 MG/DL (ref 8.8–10.2)
CHLORIDE SERPL-SCNC: 109 MMOL/L (ref 98–107)
CO2 SERPL-SCNC: 21 MMOL/L (ref 20–29)
CREAT SERPL-MCNC: 0.73 MG/DL (ref 0.8–1.3)
DIFFERENTIAL METHOD BLD: ABNORMAL
EOSINOPHIL # BLD: 0.31 K/UL (ref 0–0.8)
EOSINOPHIL NFR BLD: 2.3 % (ref 0.5–7.8)
ERYTHROCYTE [DISTWIDTH] IN BLOOD BY AUTOMATED COUNT: 13.3 % (ref 11.9–14.6)
FLUAV RNA SPEC QL NAA+PROBE: NOT DETECTED
FLUBV RNA SPEC QL NAA+PROBE: NOT DETECTED
GLOBULIN SER CALC-MCNC: 3.1 G/DL (ref 2.3–3.5)
GLUCOSE SERPL-MCNC: 107 MG/DL (ref 65–100)
HCT VFR BLD AUTO: 36.5 % (ref 35.8–46.3)
HGB BLD-MCNC: 12.4 G/DL (ref 11.7–15.4)
IMM GRANULOCYTES # BLD AUTO: 0.04 K/UL (ref 0–0.5)
IMM GRANULOCYTES NFR BLD AUTO: 0.3 % (ref 0–5)
LYMPHOCYTES # BLD: 2.81 K/UL (ref 0.5–4.6)
LYMPHOCYTES NFR BLD: 21 % (ref 13–44)
MCH RBC QN AUTO: 27.4 PG (ref 26.1–32.9)
MCHC RBC AUTO-ENTMCNC: 34 G/DL (ref 31.4–35)
MCV RBC AUTO: 80.6 FL (ref 82–102)
MONOCYTES # BLD: 1 K/UL (ref 0.1–1.3)
MONOCYTES NFR BLD: 7.5 % (ref 4–12)
NEUTS SEG # BLD: 9.16 K/UL (ref 1.7–8.2)
NEUTS SEG NFR BLD: 68.5 % (ref 43–78)
NRBC # BLD: 0 K/UL (ref 0–0.2)
PLATELET # BLD AUTO: 261 K/UL (ref 150–450)
PMV BLD AUTO: 10.3 FL (ref 9.4–12.3)
POTASSIUM SERPL-SCNC: 3.8 MMOL/L (ref 3.5–5.1)
PROT SERPL-MCNC: 7.4 G/DL (ref 6.3–8.2)
RBC # BLD AUTO: 4.53 M/UL (ref 4.05–5.2)
SARS-COV-2 RDRP RESP QL NAA+PROBE: NOT DETECTED
SODIUM SERPL-SCNC: 144 MMOL/L (ref 133–143)
SOURCE: NORMAL
TROPONIN T SERPL HS-MCNC: <6 NG/L (ref 0–14)
WBC # BLD AUTO: 13.4 K/UL (ref 4.3–11.1)

## 2025-04-08 PROCEDURE — 80053 COMPREHEN METABOLIC PANEL: CPT

## 2025-04-08 PROCEDURE — 87636 SARSCOV2 & INF A&B AMP PRB: CPT

## 2025-04-08 PROCEDURE — 2500000003 HC RX 250 WO HCPCS: Performed by: PHYSICIAN ASSISTANT

## 2025-04-08 PROCEDURE — 99285 EMERGENCY DEPT VISIT HI MDM: CPT

## 2025-04-08 PROCEDURE — 85025 COMPLETE CBC W/AUTO DIFF WBC: CPT

## 2025-04-08 PROCEDURE — 96374 THER/PROPH/DIAG INJ IV PUSH: CPT

## 2025-04-08 PROCEDURE — 6360000002 HC RX W HCPCS: Performed by: PHYSICIAN ASSISTANT

## 2025-04-08 PROCEDURE — 93005 ELECTROCARDIOGRAM TRACING: CPT | Performed by: EMERGENCY MEDICINE

## 2025-04-08 PROCEDURE — 84484 ASSAY OF TROPONIN QUANT: CPT

## 2025-04-08 PROCEDURE — 71260 CT THORAX DX C+: CPT

## 2025-04-08 PROCEDURE — 96375 TX/PRO/DX INJ NEW DRUG ADDON: CPT

## 2025-04-08 PROCEDURE — 6360000004 HC RX CONTRAST MEDICATION: Performed by: PHYSICIAN ASSISTANT

## 2025-04-08 PROCEDURE — 6370000000 HC RX 637 (ALT 250 FOR IP): Performed by: PHYSICIAN ASSISTANT

## 2025-04-08 RX ORDER — KETOROLAC TROMETHAMINE 15 MG/ML
15 INJECTION, SOLUTION INTRAMUSCULAR; INTRAVENOUS ONCE
Status: COMPLETED | OUTPATIENT
Start: 2025-04-08 | End: 2025-04-08

## 2025-04-08 RX ORDER — HYDROCODONE BITARTRATE AND HOMATROPINE METHYLBROMIDE ORAL SOLUTION 5; 1.5 MG/5ML; MG/5ML
5 LIQUID ORAL
Refills: 0 | Status: COMPLETED | OUTPATIENT
Start: 2025-04-08 | End: 2025-04-08

## 2025-04-08 RX ORDER — GUAIFENESIN 600 MG/1
600 TABLET, EXTENDED RELEASE ORAL 2 TIMES DAILY
Qty: 30 TABLET | Refills: 0 | Status: SHIPPED | OUTPATIENT
Start: 2025-04-08 | End: 2025-04-23

## 2025-04-08 RX ORDER — IOPAMIDOL 755 MG/ML
100 INJECTION, SOLUTION INTRAVASCULAR
Status: COMPLETED | OUTPATIENT
Start: 2025-04-08 | End: 2025-04-08

## 2025-04-08 RX ORDER — PREDNISONE 50 MG/1
50 TABLET ORAL DAILY
Qty: 5 TABLET | Refills: 0 | Status: SHIPPED | OUTPATIENT
Start: 2025-04-08 | End: 2025-04-13

## 2025-04-08 RX ORDER — HYDROCODONE BITARTRATE AND HOMATROPINE METHYLBROMIDE ORAL SOLUTION 5; 1.5 MG/5ML; MG/5ML
5 LIQUID ORAL EVERY 6 HOURS PRN
Qty: 50 ML | Refills: 0 | Status: SHIPPED | OUTPATIENT
Start: 2025-04-08 | End: 2025-04-11

## 2025-04-08 RX ORDER — BENZONATATE 100 MG/1
100 CAPSULE ORAL 3 TIMES DAILY PRN
Qty: 30 CAPSULE | Refills: 0 | Status: SHIPPED | OUTPATIENT
Start: 2025-04-08 | End: 2025-04-18

## 2025-04-08 RX ADMIN — KETOROLAC TROMETHAMINE 15 MG: 15 INJECTION, SOLUTION INTRAMUSCULAR; INTRAVENOUS at 19:24

## 2025-04-08 RX ADMIN — IOPAMIDOL 100 ML: 755 INJECTION, SOLUTION INTRAVENOUS at 20:17

## 2025-04-08 RX ADMIN — HYDROCODONE BITARTRATE AND HOMATROPINE METHYLBROMIDE 5 ML: 5; 1.5 SOLUTION ORAL at 19:26

## 2025-04-08 RX ADMIN — WATER 125 MG: 1 INJECTION INTRAMUSCULAR; INTRAVENOUS; SUBCUTANEOUS at 19:25

## 2025-04-08 ASSESSMENT — ENCOUNTER SYMPTOMS
SHORTNESS OF BREATH: 1
COUGH: 1

## 2025-04-08 ASSESSMENT — PAIN DESCRIPTION - LOCATION: LOCATION: CHEST

## 2025-04-08 ASSESSMENT — PAIN SCALES - GENERAL
PAINLEVEL_OUTOF10: 6
PAINLEVEL_OUTOF10: 9
PAINLEVEL_OUTOF10: 10

## 2025-04-08 ASSESSMENT — LIFESTYLE VARIABLES
HOW MANY STANDARD DRINKS CONTAINING ALCOHOL DO YOU HAVE ON A TYPICAL DAY: PATIENT DOES NOT DRINK
HOW OFTEN DO YOU HAVE A DRINK CONTAINING ALCOHOL: NEVER

## 2025-04-08 ASSESSMENT — PAIN - FUNCTIONAL ASSESSMENT: PAIN_FUNCTIONAL_ASSESSMENT: 0-10

## 2025-04-08 NOTE — ED TRIAGE NOTES
used for triage. Session code 44889.   Pt ambulatory to triage with steady gait by self. Pt reports having gone to \"a clinic\" and was dx with bronchitis 8 days ago. Pt reports no improvement with medications. PT c/o cough, SOB, and mid chest pain and reports \"I feel like I'm drowning\". PT describes pain as constant. PT also c/o fatigue and body aches. PT took tylenol for fever pta.

## 2025-04-08 NOTE — ED PROVIDER NOTES
MG   Result Value Ref Range    Sodium 144 (H) 133 - 143 mmol/L    Potassium 3.8 3.5 - 5.1 mmol/L    Chloride 109 (H) 98 - 107 mmol/L    CO2 21 20 - 29 mmol/L    Anion Gap 14 7 - 16 mmol/L    Glucose 107 (H) 65 - 100 mg/dL    BUN 8 6 - 23 MG/DL    Creatinine 0.73 (L) 0.80 - 1.30 MG/DL    Est, Glom Filt Rate >90 >60 ml/min/1.73m2    Calcium 9.1 8.8 - 10.2 MG/DL    Total Bilirubin 0.2 0.0 - 1.2 MG/DL    ALT 17 12 - 65 U/L    AST 18 15 - 37 U/L    Alk Phosphatase 118 (H) 35 - 104 U/L    Total Protein 7.4 6.3 - 8.2 g/dL    Albumin 4.3 3.5 - 5.0 g/dL    Globulin 3.1 2.3 - 3.5 g/dL    Albumin/Globulin Ratio 1.4 1.0 - 1.9     Troponin now then q90 min for 2 occurances   Result Value Ref Range    Troponin T <6.0 0 - 14 ng/L         CT CHEST PULMONARY EMBOLISM W CONTRAST   Final Result   Negative for pulmonary emboli. No acute airspace disease.            Electronically signed by Declan Levi                   Recent Labs     04/08/25  1929   COVID19 Not detected        Voice dictation software was used during the making of this note.  This software is not perfect and grammatical and other typographical errors may be present.  This note has not been completely proofread for errors.      Anna Martinez PA  04/08/25 [id number]

## 2025-04-09 LAB
EKG ATRIAL RATE: 77 BPM
EKG DIAGNOSIS: NORMAL
EKG P AXIS: 31 DEGREES
EKG P-R INTERVAL: 154 MS
EKG Q-T INTERVAL: 385 MS
EKG QRS DURATION: 87 MS
EKG QTC CALCULATION (BAZETT): 439 MS
EKG R AXIS: 26 DEGREES
EKG T AXIS: 10 DEGREES
EKG VENTRICULAR RATE: 78 BPM

## 2025-04-09 PROCEDURE — 93010 ELECTROCARDIOGRAM REPORT: CPT | Performed by: INTERNAL MEDICINE

## 2025-04-09 NOTE — ED NOTES
I have reviewed discharge instructions with the patient.  The patient verbalized understanding.    Patient left ED via Discharge Method: ambulatory to Home by self.    Opportunity for questions and clarification provided.       Patient given 4 scripts. E-scribed to [facility].

## 2025-04-17 ENCOUNTER — TELEPHONE (OUTPATIENT)
Dept: PRIMARY CARE CLINIC | Facility: CLINIC | Age: 45
End: 2025-04-17

## 2025-04-17 NOTE — TELEPHONE ENCOUNTER
I assisted patient with scheduling appointment with -[facility]. Made pt aware of location and appointment time Via mail and by phone. I also re faxed referral to [facility].

## 2025-04-18 PROBLEM — Z01.419 WOMEN'S ANNUAL ROUTINE GYNECOLOGICAL EXAMINATION: Status: RESOLVED | Noted: 2025-03-19 | Resolved: 2025-04-18

## 2025-04-21 ENCOUNTER — TELEPHONE (OUTPATIENT)
Dept: PRIMARY CARE CLINIC | Facility: CLINIC | Age: 45
End: 2025-04-21

## 2025-04-21 NOTE — TELEPHONE ENCOUNTER
Aly states that she has been tried calling [facility] and they stated they no longer take medicaid insurance.       Give aly a call if there are any questions, she helps patient with appt and communication since patient does not speak any english.     Would like to know if there are any dermatologists that take medicaid that she can be referred to.

## 2025-04-24 ENCOUNTER — HOSPITAL ENCOUNTER (OUTPATIENT)
Dept: MAMMOGRAPHY | Age: 45
Discharge: HOME | End: 2025-04-27
Payer: MEDICAID

## 2025-04-24 DIAGNOSIS — Z12.31 SCREENING MAMMOGRAM FOR BREAST CANCER: ICD-10-CM

## 2025-04-24 PROCEDURE — 77063 BREAST TOMOSYNTHESIS BI: CPT

## 2025-04-29 ENCOUNTER — OFFICE VISIT (OUTPATIENT)
Dept: PRIMARY CARE CLINIC | Facility: CLINIC | Age: 45
End: 2025-04-29

## 2025-04-29 VITALS
WEIGHT: 223.6 LBS | BODY MASS INDEX: 38.17 KG/M2 | HEART RATE: 64 BPM | DIASTOLIC BLOOD PRESSURE: 70 MMHG | SYSTOLIC BLOOD PRESSURE: 110 MMHG | OXYGEN SATURATION: 98 % | TEMPERATURE: 97.3 F | HEIGHT: 64 IN

## 2025-04-29 DIAGNOSIS — Z48.02 VISIT FOR SUTURE REMOVAL: ICD-10-CM

## 2025-04-29 DIAGNOSIS — E78.1 HYPERTRIGLYCERIDEMIA: ICD-10-CM

## 2025-04-29 DIAGNOSIS — R73.03 PREDIABETES: Primary | ICD-10-CM

## 2025-04-29 DIAGNOSIS — Z23 NEED FOR VARICELLA VACCINE: ICD-10-CM

## 2025-04-29 DIAGNOSIS — Z23 NEED FOR HEPATITIS B VACCINATION: ICD-10-CM

## 2025-04-29 DIAGNOSIS — K59.00 CONSTIPATION, UNSPECIFIED CONSTIPATION TYPE: ICD-10-CM

## 2025-04-29 DIAGNOSIS — K21.9 GASTROESOPHAGEAL REFLUX DISEASE, UNSPECIFIED WHETHER ESOPHAGITIS PRESENT: ICD-10-CM

## 2025-04-29 DIAGNOSIS — E55.9 VITAMIN D DEFICIENCY: ICD-10-CM

## 2025-04-29 DIAGNOSIS — I83.812 VARICOSE VEINS OF LEFT LOWER EXTREMITY WITH PAIN: ICD-10-CM

## 2025-04-29 PROBLEM — M79.605 PAIN OF LEFT LOWER EXTREMITY: Status: RESOLVED | Noted: 2024-11-21 | Resolved: 2025-04-29

## 2025-04-29 PROBLEM — G47.9 FATIGUE DUE TO SLEEP PATTERN DISTURBANCE: Status: RESOLVED | Noted: 2024-11-21 | Resolved: 2025-04-29

## 2025-04-29 PROBLEM — R07.9 CHEST PAIN: Status: RESOLVED | Noted: 2024-12-18 | Resolved: 2025-04-29

## 2025-04-29 PROBLEM — Z76.89 ENCOUNTER TO ESTABLISH CARE: Status: RESOLVED | Noted: 2024-11-21 | Resolved: 2025-04-29

## 2025-04-29 PROBLEM — R53.83 FATIGUE DUE TO SLEEP PATTERN DISTURBANCE: Status: RESOLVED | Noted: 2024-11-21 | Resolved: 2025-04-29

## 2025-04-29 RX ORDER — PHENTERMINE HYDROCHLORIDE 37.5 MG/1
37.5 TABLET ORAL
Qty: 30 TABLET | Refills: 0 | Status: SHIPPED | OUTPATIENT
Start: 2025-04-29 | End: 2025-05-29

## 2025-04-29 RX ORDER — ACETAMINOPHEN 500 MG
500 TABLET ORAL EVERY 6 HOURS PRN
COMMUNITY
Start: 2024-11-14 | End: 2025-04-29 | Stop reason: DRUGHIGH

## 2025-04-29 RX ORDER — PSYLLIUM HUSK 100 %
15 POWDER (GRAM) MISCELLANEOUS 2 TIMES DAILY
Qty: 900 G | Refills: 5 | Status: SHIPPED | OUTPATIENT
Start: 2025-04-29 | End: 2025-05-29

## 2025-04-29 RX ORDER — OMEPRAZOLE 20 MG/1
20 CAPSULE, DELAYED RELEASE ORAL
Qty: 90 CAPSULE | Refills: 3 | Status: SHIPPED | OUTPATIENT
Start: 2025-04-29 | End: 2026-04-24

## 2025-04-29 RX ORDER — FENOFIBRATE 145 MG/1
145 TABLET, COATED ORAL DAILY
Qty: 90 TABLET | Refills: 3 | Status: SHIPPED | OUTPATIENT
Start: 2025-04-29 | End: 2026-04-24

## 2025-04-29 RX ORDER — ACETAMINOPHEN 500 MG
1000 TABLET ORAL 3 TIMES DAILY PRN
Qty: 540 TABLET | Refills: 1 | Status: SHIPPED | OUTPATIENT
Start: 2025-04-29

## 2025-04-29 NOTE — PROGRESS NOTES
[facility]  Hallie \"Miguelina\" LACHO Crockett  [address], [place] [address]  [phone]         ASSESSMENT AND PLAN  Problem List Items Addressed This Visit       Constipation    Relevant Medications    Psyllium Husk POWD    BMI 38.0-38.9,adult    Relevant Medications    phentermine (ADIPEX-P) 37.5 MG tablet    Hypertriglyceridemia    Relevant Medications    fenofibrate (TRICOR) 145 MG tablet    Varicose veins of left lower extremity with pain    Relevant Medications    acetaminophen (TYLENOL) 500 MG tablet    Other Relevant Orders    [facility]    Vitamin D deficiency    Prediabetes - Primary    Gastroesophageal reflux disease    Relevant Medications    omeprazole (PRILOSEC) 20 MG delayed release capsule    Need for varicella vaccine    Visit for suture removal    Relevant Orders    CT Removal of Sutures    Suture removal kit       Assessment & Plan  1. Left leg pain: Acute.  - Sutures successfully removed during this visit.  - Apply Neosporin until redness subsides, followed by a nonadhesive pad and Kerlix if needed for work.  - Compression socks recommended for use as tolerated for swelling.  - Referral to vascular specialist for further evaluation of leg swelling and varicose vein management, in general.    2. Gastroesophageal reflux disease: Chronic, stable  - Prescription refill for omeprazole provided.  - Inform if an increase in dosage to twice daily is required.    3. Hypertriglyceridemia: Chronic, stable  - Prescription refill for fenofibrate provided.  - No reported side effects from the medication.    4. Constipation.Chronic, stable.  - Prior authorization for psyllium husk will be initiated to secure insurance approval.  - Maintain adequate hydration while on this medication.  - Continue OTC version for now.    5. Weight management. Chronic, not at goal.  - Prescription for Adipex provided, with instructions to take it upon

## 2025-04-30 ENCOUNTER — TELEPHONE (OUTPATIENT)
Dept: PRIMARY CARE CLINIC | Facility: CLINIC | Age: 45
End: 2025-04-30

## 2025-04-30 NOTE — TELEPHONE ENCOUNTER
[facility] called to speak with MA regarding Rx for Hep B vaccines and vaccine records. Spoke with LONI Crockett for clarification and faxed vaccine record in file to Pharmacy

## 2025-05-03 PROBLEM — I83.812 VARICOSE VEINS OF LEFT LOWER EXTREMITY WITH PAIN: Status: ACTIVE | Noted: 2024-11-21

## 2025-05-03 PROBLEM — Z23 NEED FOR HEPATITIS B VACCINATION: Status: ACTIVE | Noted: 2025-05-03

## 2025-05-03 PROBLEM — Z48.02 VISIT FOR SUTURE REMOVAL: Status: ACTIVE | Noted: 2025-05-03

## 2025-05-03 NOTE — PATIENT INSTRUCTIONS
IT WAS GREAT TO SEE YOU LAST TUESDAY!    PLEASE TAKE ALL MEDICATION AS DISCUSSED.   - ADIPEX ONCE DAILY BEFORE BREAKFAST.     DRINK LOTS OF WATER. WEAR YOUR SEATBELT. FOCUS ON FRESH FRUITS AND VEGGIES DAILY, AND LEAN MEATS LIKE CHICKEN OR FISH. AVOID FRIED, FATTY FOODS, BREAD, PASTA, SALT AND \"ADDED\" SUGAR, ESPECIALLY JUICES AND SODA. AVOID TOBACCO AND EXCESSIVE ALCOHOL. EXERCISE AT LEAST 5 TIMES A WEEK FOR AT LEAST 30 MINUTES AT A TIME. KEEP A CONSISTENT SLEEP SCHEDULE.     REFERRALS SENT TO [facility] FOR YOUR VARICOSE VEINS. PLEASE LET US KNOW, IF YOU DO NOT HEAR FROM THEM IN NEXT TWO WEEKS.      MAKE SURE TO GET THE HEPATITIS B AND VARICELLA VACCINES AT [facility].     WE WILL SEE YOU AGAIN AT YOUR NEXT APPOINTMENT WITH ME 4/30/2025 BUT PLEASE SEND [employer] MESSAGE OR CALL WITH CONCERNS -[phone]

## 2025-05-20 PROBLEM — Z48.02 VISIT FOR SUTURE REMOVAL: Status: RESOLVED | Noted: 2025-05-03 | Resolved: 2025-05-20

## 2025-06-03 ENCOUNTER — OFFICE VISIT (OUTPATIENT)
Dept: PRIMARY CARE CLINIC | Facility: CLINIC | Age: 45
End: 2025-06-03
Payer: MEDICAID

## 2025-06-03 VITALS
HEIGHT: 64 IN | DIASTOLIC BLOOD PRESSURE: 60 MMHG | SYSTOLIC BLOOD PRESSURE: 100 MMHG | HEART RATE: 78 BPM | WEIGHT: 213.4 LBS | OXYGEN SATURATION: 99 % | BODY MASS INDEX: 36.43 KG/M2 | TEMPERATURE: 97.2 F

## 2025-06-03 DIAGNOSIS — I83.812 VARICOSE VEINS OF LEFT LOWER EXTREMITY WITH PAIN: ICD-10-CM

## 2025-06-03 DIAGNOSIS — K59.00 CONSTIPATION, UNSPECIFIED CONSTIPATION TYPE: Primary | ICD-10-CM

## 2025-06-03 DIAGNOSIS — I20.89 ANGINAL EQUIVALENT: ICD-10-CM

## 2025-06-03 DIAGNOSIS — Z79.01 CURRENT USE OF LONG TERM ANTICOAGULATION: ICD-10-CM

## 2025-06-03 DIAGNOSIS — D68.52 PROTHROMBIN GENE MUTATION: ICD-10-CM

## 2025-06-03 PROCEDURE — 99214 OFFICE O/P EST MOD 30 MIN: CPT

## 2025-06-03 RX ORDER — PHENTERMINE HYDROCHLORIDE 37.5 MG/1
37.5 TABLET ORAL
Qty: 30 TABLET | Refills: 0 | Status: SHIPPED | OUTPATIENT
Start: 2025-06-03 | End: 2025-07-03

## 2025-06-03 SDOH — ECONOMIC STABILITY: FOOD INSECURITY: WITHIN THE PAST 12 MONTHS, YOU WORRIED THAT YOUR FOOD WOULD RUN OUT BEFORE YOU GOT MONEY TO BUY MORE.: NEVER TRUE

## 2025-06-03 SDOH — ECONOMIC STABILITY: FOOD INSECURITY: WITHIN THE PAST 12 MONTHS, THE FOOD YOU BOUGHT JUST DIDN'T LAST AND YOU DIDN'T HAVE MONEY TO GET MORE.: NEVER TRUE

## 2025-06-03 NOTE — PROGRESS NOTES
[facility]  Hallie \"Miguelina\" LACHO Crockett  [address], [place] [address]  [phone]         ASSESSMENT AND PLAN    Problem List Items Addressed This Visit       Constipation - Primary    BMI 36.0-36.9,adult    Relevant Medications    phentermine (ADIPEX-P) 37.5 MG tablet    Varicose veins of left lower extremity with pain    Prothrombin gene mutation    Anginal equivalent    Relevant Orders    [facility]    Current use of long term anticoagulation       Assessment & Plan  1. Weight management: Stable.  - Successfully lost 10 pounds, reducing weight from 221 to 213 pounds.  - No reported side effects from the weight loss medication.  - Advised to take the medication every day before breakfast. May continue every other day, if would like.  - If shortness of breath worsens or episodes increase, inform the clinic immediately. If symptoms are exacerbated by the medication, reduce dosage by half and take only in the morning.    2. Constipation: Chronic.  - Insurance does not cover psyllium husk powder (Metamucil), but it can be purchased over the counter.  - Continues to use psyllium husk powder to manage constipation effectively.  - Advised to maintain the current regimen as it helps with symptoms.    3. Shortness of breath: Intermittent, on exertion  - Reports occasional shortness of breath, improved with weight loss but still occurs about four times a month.  - Denies worsening with adipex. States has improved.  - Referral to cardiology for further evaluation, including a potential stress test.    4. Blood thinner management: Lifelong.  - Seen by vascular specialist who recommended lifelong use of blood thinners due to damaged vein supplying blood to the heart.  - Currently on 2.5 mg twice a day, confirmed by hematologist.  - Advised to notify the clinic if refills are needed to ensure continuous medication adherence.  - Continue compression

## 2025-06-03 NOTE — CONSULTS
Session ID: [id number]  Session Duration: Longer than 52 minutes  Language: Mongolian   ID: #[id number]   Name: Néstor

## 2025-06-07 PROBLEM — Z79.01 CURRENT USE OF LONG TERM ANTICOAGULATION: Status: ACTIVE | Noted: 2025-06-07

## 2025-07-07 ENCOUNTER — OFFICE VISIT (OUTPATIENT)
Dept: PRIMARY CARE CLINIC | Facility: CLINIC | Age: 45
End: 2025-07-07
Payer: MEDICAID

## 2025-07-07 VITALS
HEIGHT: 64 IN | DIASTOLIC BLOOD PRESSURE: 70 MMHG | TEMPERATURE: 97.8 F | OXYGEN SATURATION: 99 % | SYSTOLIC BLOOD PRESSURE: 110 MMHG | WEIGHT: 204 LBS | HEART RATE: 72 BPM | BODY MASS INDEX: 34.83 KG/M2

## 2025-07-07 DIAGNOSIS — S99.922A INJURY OF TOENAIL OF LEFT FOOT, INITIAL ENCOUNTER: Primary | ICD-10-CM

## 2025-07-07 DIAGNOSIS — B99.9 RECURRENT INFECTIONS: ICD-10-CM

## 2025-07-07 DIAGNOSIS — S99.921A INJURY OF TOENAIL OF RIGHT FOOT, INITIAL ENCOUNTER: ICD-10-CM

## 2025-07-07 DIAGNOSIS — Z79.01 CURRENT USE OF LONG TERM ANTICOAGULATION: ICD-10-CM

## 2025-07-07 DIAGNOSIS — K59.00 CONSTIPATION, UNSPECIFIED CONSTIPATION TYPE: ICD-10-CM

## 2025-07-07 DIAGNOSIS — I83.812 VARICOSE VEINS OF LEFT LOWER EXTREMITY WITH PAIN: ICD-10-CM

## 2025-07-07 PROCEDURE — 99214 OFFICE O/P EST MOD 30 MIN: CPT

## 2025-07-07 RX ORDER — PHENTERMINE HYDROCHLORIDE 37.5 MG/1
37.5 TABLET ORAL
Qty: 30 TABLET | Refills: 0 | Status: SHIPPED | OUTPATIENT
Start: 2025-07-07 | End: 2025-08-06

## 2025-07-07 RX ORDER — ALBUTEROL SULFATE 90 UG/1
2 INHALANT RESPIRATORY (INHALATION) 4 TIMES DAILY PRN
Qty: 18 G | Refills: 0 | Status: SHIPPED | OUTPATIENT
Start: 2025-07-07

## 2025-07-07 NOTE — CONSULTS
Session ID: [id number]  Session Duration: Longer than 50 minutes  Language: Kazakh   ID: #[id number]   Name: Cody

## 2025-07-07 NOTE — PROGRESS NOTES
[facility]  Hallie \"Miguelina\" LACHO Crockett  [address] [place] [address]  [phone]         ASSESSMENT AND PLAN    Problem List Items Addressed This Visit       Constipation    BMI 35.0-35.9,adult    Relevant Medications    phentermine (ADIPEX-P) 37.5 MG tablet    Varicose veins of left lower extremity with pain    Current use of long term anticoagulation    Recurrent infections    Relevant Medications    albuterol sulfate HFA (VENTOLIN HFA) 108 (90 Base) MCG/ACT inhaler    Injury of toenail of left foot - Primary    Relevant Orders    Amb External Referral To Podiatry    Injury of toenail of right foot    Relevant Orders    Amb External Referral To Podiatry       Assessment & Plan  1. Weight loss: Chronic, improving  - Maintaining a healthy diet and engaging in regular physical activity, including walking, especially at work.  - Bowel movements are regular with the help of psyllium husk and Metamucil.  - PDMP reviewed. Prescription for Adipex (phentermine) has been renewed for another month. This will be the last of 3 months and then break.     2. Toenail issues/ would like toenails removed bilateral 2nd digits. Chronic, worsening  - Referral to a podiatrist has been made for further evaluation and treatment of toenail issues.  - Podiatrist will discuss the recovery period and any necessary paperwork for her employer. Although, advised that we are also happy to help with the paperwork.     3. Frequent colds. Chronic, uncertain prognosis  - Advised to start a daily multivitamin regimen, such as Centrum or Women's One-A-Day, to support the immune system.  - Albuterol inhaler prescribed for use during episodes of wheezing or shortness of breath.  - Inform the office about the frequency of inhaler use.    4. Medication management.  - Currently taking Eliquis and advised to avoid ibuprofen due to potential interactions.  - Tylenol recommended for pain or fever

## 2025-07-21 ENCOUNTER — PROCEDURE VISIT (OUTPATIENT)
Dept: OBGYN CLINIC | Age: 45
End: 2025-07-21
Payer: MEDICAID

## 2025-07-21 VITALS
DIASTOLIC BLOOD PRESSURE: 70 MMHG | HEIGHT: 64 IN | WEIGHT: 205 LBS | SYSTOLIC BLOOD PRESSURE: 108 MMHG | BODY MASS INDEX: 35 KG/M2

## 2025-07-21 DIAGNOSIS — R87.612 LGSIL ON PAP SMEAR OF CERVIX: Primary | ICD-10-CM

## 2025-07-21 PROBLEM — K59.00 CONSTIPATION: Status: RESOLVED | Noted: 2024-11-21 | Resolved: 2025-07-21

## 2025-07-21 LAB
HCG, PREGNANCY, URINE, POC: NEGATIVE
VALID INTERNAL CONTROL, POC: YES

## 2025-07-21 PROCEDURE — 57454 BX/CURETT OF CERVIX W/SCOPE: CPT | Performed by: OBSTETRICS & GYNECOLOGY

## 2025-07-21 PROCEDURE — 81025 URINE PREGNANCY TEST: CPT | Performed by: OBSTETRICS & GYNECOLOGY

## 2025-07-21 RX ORDER — CYCLOBENZAPRINE HCL 5 MG
TABLET ORAL
COMMUNITY
Start: 2025-07-18

## 2025-07-21 NOTE — PROGRESS NOTES
Chaperone for Intimate Exam     Chaperone was offer accepted as part of the rooming process    Chaperone: Yarely CURRAN

## 2025-07-21 NOTE — PROGRESS NOTES
[facility]  [address], [place] [address]  [phone]    Bautista Denton MD, FACOG  [facility]    Colposcopy Procedure Note    : Kathy [id number]     Indications: This is a 45 y.o.  /  female,  who presents for a colposcopy.  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 Bivalved speculum placed in vagina without difficulty.  Acetic acid placed over entire face of cervix.  Colposcopy performed in usually fashion.  Entire SCJ seen - adequate colpo.  Acetowhite changes noted from 6-12 o'clock  Biopsies done at 7, 12 o'clock  ECC also perfomed  Monsels used for hemostasis.  All specimens sent to path.  Pt tolerated procedure well.      Physical Exam  Genitourinary:                  Specimens: Cervical Bx x 2, ECC    Complications: none    Colposcopic Dx:  DRU 1-2    Plan:    Follow up as needed or virtual visit in 2 weeks to discuss path results.  Routine post-procedure instructions given to pt.    The physical exam portion of the visit was chaperoned by female staff member     Bautista Denton MD   2:15 PM  25

## 2025-07-21 NOTE — PROGRESS NOTES
Patient comes in today for colposcopy.     : Kathy [id number]    LAST PAP:  03/19/2025 LSIL, HPV pos      LAST MAMMO:  04/24/2025     LMP:  Patient's last menstrual period was 07/13/2025 (approximate).    BIRTH CONTROL:  tubal ligation    TOBACCO USE:  No    FAMILY HISTORY OF:   Breast Cancer:  No   Ovarian Cancer:  No   Uterine Cancer:  No   Colon Cancer:  No    Vitals:    07/21/25 1349   BP: 108/70   BP Site: Left Upper Arm   Patient Position: Sitting   Weight: 93 kg (205 lb)   Height: 1.626 m (5' 4\")        Yarely Clark MA  07/21/25  2:10 PM

## 2025-07-21 NOTE — CONSULTS
Session ID: [id number]  Session Duration: Longer than 54 minutes  Language: Swedish   ID: #[id number]   Name: Kathy

## 2025-07-21 NOTE — PROGRESS NOTES
600mg of ibuprofen given at 2:40pm on 7/21/2025 in office after her colposcopy was completed to help treat her complaints of increased cramps.

## 2025-07-30 PROBLEM — N87.0 CIN I (CERVICAL INTRAEPITHELIAL NEOPLASIA I): Status: ACTIVE | Noted: 2025-07-30

## 2025-08-05 ENCOUNTER — OFFICE VISIT (OUTPATIENT)
Dept: OBGYN CLINIC | Age: 45
End: 2025-08-05
Payer: MEDICAID

## 2025-08-05 ENCOUNTER — TELEPHONE (OUTPATIENT)
Dept: OBGYN CLINIC | Age: 45
End: 2025-08-05

## 2025-08-05 VITALS
WEIGHT: 203 LBS | HEIGHT: 64 IN | SYSTOLIC BLOOD PRESSURE: 100 MMHG | DIASTOLIC BLOOD PRESSURE: 68 MMHG | BODY MASS INDEX: 34.66 KG/M2

## 2025-08-05 DIAGNOSIS — N87.0 CIN I (CERVICAL INTRAEPITHELIAL NEOPLASIA I): Primary | ICD-10-CM

## 2025-08-05 PROBLEM — I20.89 ANGINAL EQUIVALENT: Status: RESOLVED | Noted: 2025-06-03 | Resolved: 2025-08-05

## 2025-08-05 PROBLEM — S99.922A INJURY OF TOENAIL OF LEFT FOOT: Status: RESOLVED | Noted: 2025-07-07 | Resolved: 2025-08-05

## 2025-08-05 PROBLEM — S99.921A INJURY OF TOENAIL OF RIGHT FOOT: Status: RESOLVED | Noted: 2025-07-07 | Resolved: 2025-08-05

## 2025-08-05 PROBLEM — B99.9 RECURRENT INFECTIONS: Status: RESOLVED | Noted: 2025-07-07 | Resolved: 2025-08-05

## 2025-08-05 PROCEDURE — 99213 OFFICE O/P EST LOW 20 MIN: CPT | Performed by: OBSTETRICS & GYNECOLOGY

## 2025-08-07 RX ORDER — IBUPROFEN 200 MG
400 TABLET ORAL EVERY 6 HOURS PRN
COMMUNITY

## 2025-08-18 ENCOUNTER — OFFICE VISIT (OUTPATIENT)
Dept: PRIMARY CARE CLINIC | Facility: CLINIC | Age: 45
End: 2025-08-18
Payer: MEDICAID

## 2025-08-18 VITALS
WEIGHT: 201.2 LBS | BODY MASS INDEX: 34.35 KG/M2 | HEART RATE: 72 BPM | SYSTOLIC BLOOD PRESSURE: 106 MMHG | OXYGEN SATURATION: 97 % | HEIGHT: 64 IN | DIASTOLIC BLOOD PRESSURE: 78 MMHG | RESPIRATION RATE: 16 BRPM

## 2025-08-18 DIAGNOSIS — Z79.01 CURRENT USE OF LONG TERM ANTICOAGULATION: Primary | ICD-10-CM

## 2025-08-18 DIAGNOSIS — Z23 NEED FOR HEPATITIS B VACCINATION: ICD-10-CM

## 2025-08-18 DIAGNOSIS — B99.9 RECURRENT INFECTIONS: ICD-10-CM

## 2025-08-18 DIAGNOSIS — Z23 NEED FOR VARICELLA VACCINE: ICD-10-CM

## 2025-08-18 PROCEDURE — 99213 OFFICE O/P EST LOW 20 MIN: CPT

## 2025-08-25 RX ORDER — SODIUM CHLORIDE 0.9 % (FLUSH) 0.9 %
5-40 SYRINGE (ML) INJECTION PRN
Status: CANCELLED | OUTPATIENT
Start: 2025-08-25

## 2025-08-26 ENCOUNTER — INITIAL CONSULT (OUTPATIENT)
Age: 45
End: 2025-08-26
Payer: MEDICAID

## 2025-08-26 VITALS
BODY MASS INDEX: 35 KG/M2 | HEIGHT: 64 IN | WEIGHT: 205 LBS | SYSTOLIC BLOOD PRESSURE: 100 MMHG | DIASTOLIC BLOOD PRESSURE: 72 MMHG | HEART RATE: 65 BPM

## 2025-08-26 DIAGNOSIS — E78.2 MIXED HYPERLIPIDEMIA: Chronic | ICD-10-CM

## 2025-08-26 DIAGNOSIS — R07.89 OTHER CHEST PAIN: Primary | ICD-10-CM

## 2025-08-26 PROCEDURE — 99204 OFFICE O/P NEW MOD 45 MIN: CPT | Performed by: INTERNAL MEDICINE

## 2025-08-26 ASSESSMENT — ENCOUNTER SYMPTOMS: SHORTNESS OF BREATH: 0

## 2025-08-27 ENCOUNTER — ANESTHESIA (OUTPATIENT)
Dept: SURGERY | Age: 45
End: 2025-08-27
Payer: MEDICAID

## 2025-08-27 ENCOUNTER — HOSPITAL ENCOUNTER (OUTPATIENT)
Age: 45
Setting detail: OUTPATIENT SURGERY
Discharge: HOME | End: 2025-08-27
Attending: OBSTETRICS & GYNECOLOGY | Admitting: OBSTETRICS & GYNECOLOGY
Payer: MEDICAID

## 2025-08-27 ENCOUNTER — ANESTHESIA EVENT (OUTPATIENT)
Dept: SURGERY | Age: 45
End: 2025-08-27
Payer: MEDICAID

## 2025-08-27 VITALS
WEIGHT: 203 LBS | HEIGHT: 64 IN | BODY MASS INDEX: 34.66 KG/M2 | SYSTOLIC BLOOD PRESSURE: 105 MMHG | HEART RATE: 49 BPM | RESPIRATION RATE: 20 BRPM | OXYGEN SATURATION: 97 % | DIASTOLIC BLOOD PRESSURE: 66 MMHG

## 2025-08-27 DIAGNOSIS — N87.0 CIN I (CERVICAL INTRAEPITHELIAL NEOPLASIA I): Primary | ICD-10-CM

## 2025-08-27 LAB
HCG UR QL: NEGATIVE
TROPONIN T SERPL HS-MCNC: <6 NG/L (ref 0–14)

## 2025-08-27 PROCEDURE — 6360000002 HC RX W HCPCS: Performed by: OBSTETRICS & GYNECOLOGY

## 2025-08-27 PROCEDURE — 7100000011 HC PHASE II RECOVERY - ADDTL 15 MIN: Performed by: OBSTETRICS & GYNECOLOGY

## 2025-08-27 PROCEDURE — 6370000000 HC RX 637 (ALT 250 FOR IP): Performed by: OBSTETRICS & GYNECOLOGY

## 2025-08-27 PROCEDURE — 6360000002 HC RX W HCPCS: Performed by: NURSE ANESTHETIST, CERTIFIED REGISTERED

## 2025-08-27 PROCEDURE — 2500000003 HC RX 250 WO HCPCS: Performed by: NURSE ANESTHETIST, CERTIFIED REGISTERED

## 2025-08-27 PROCEDURE — 2580000003 HC RX 258: Performed by: ANESTHESIOLOGY

## 2025-08-27 PROCEDURE — 2709999900 HC NON-CHARGEABLE SUPPLY: Performed by: OBSTETRICS & GYNECOLOGY

## 2025-08-27 PROCEDURE — 88307 TISSUE EXAM BY PATHOLOGIST: CPT

## 2025-08-27 PROCEDURE — 7100000001 HC PACU RECOVERY - ADDTL 15 MIN: Performed by: OBSTETRICS & GYNECOLOGY

## 2025-08-27 PROCEDURE — 84484 ASSAY OF TROPONIN QUANT: CPT

## 2025-08-27 PROCEDURE — 3600000012 HC SURGERY LEVEL 2 ADDTL 15MIN: Performed by: OBSTETRICS & GYNECOLOGY

## 2025-08-27 PROCEDURE — 3600000002 HC SURGERY LEVEL 2 BASE: Performed by: OBSTETRICS & GYNECOLOGY

## 2025-08-27 PROCEDURE — 81025 URINE PREGNANCY TEST: CPT

## 2025-08-27 PROCEDURE — 3700000000 HC ANESTHESIA ATTENDED CARE: Performed by: OBSTETRICS & GYNECOLOGY

## 2025-08-27 PROCEDURE — 7100000010 HC PHASE II RECOVERY - FIRST 15 MIN: Performed by: OBSTETRICS & GYNECOLOGY

## 2025-08-27 PROCEDURE — 7100000000 HC PACU RECOVERY - FIRST 15 MIN: Performed by: OBSTETRICS & GYNECOLOGY

## 2025-08-27 PROCEDURE — 3700000001 HC ADD 15 MINUTES (ANESTHESIA): Performed by: OBSTETRICS & GYNECOLOGY

## 2025-08-27 PROCEDURE — 88305 TISSUE EXAM BY PATHOLOGIST: CPT

## 2025-08-27 PROCEDURE — 6370000000 HC RX 637 (ALT 250 FOR IP): Performed by: ANESTHESIOLOGY

## 2025-08-27 RX ORDER — ACETAMINOPHEN 500 MG
1000 TABLET ORAL ONCE
Status: COMPLETED | OUTPATIENT
Start: 2025-08-27 | End: 2025-08-27

## 2025-08-27 RX ORDER — DEXAMETHASONE SODIUM PHOSPHATE 10 MG/ML
INJECTION, SOLUTION INTRA-ARTICULAR; INTRALESIONAL; INTRAMUSCULAR; INTRAVENOUS; SOFT TISSUE
Status: DISCONTINUED | OUTPATIENT
Start: 2025-08-27 | End: 2025-08-27 | Stop reason: SDUPTHER

## 2025-08-27 RX ORDER — HYDROCODONE BITARTRATE AND ACETAMINOPHEN 5; 325 MG/1; MG/1
1 TABLET ORAL EVERY 6 HOURS PRN
Qty: 10 TABLET | Refills: 0 | Status: SHIPPED | OUTPATIENT
Start: 2025-08-27 | End: 2025-08-30

## 2025-08-27 RX ORDER — LIDOCAINE HYDROCHLORIDE 10 MG/ML
1 INJECTION, SOLUTION INFILTRATION; PERINEURAL
Status: DISCONTINUED | OUTPATIENT
Start: 2025-08-27 | End: 2025-08-27 | Stop reason: HOSPADM

## 2025-08-27 RX ORDER — SODIUM CHLORIDE, SODIUM LACTATE, POTASSIUM CHLORIDE, CALCIUM CHLORIDE 600; 310; 30; 20 MG/100ML; MG/100ML; MG/100ML; MG/100ML
INJECTION, SOLUTION INTRAVENOUS CONTINUOUS
Status: DISCONTINUED | OUTPATIENT
Start: 2025-08-27 | End: 2025-08-27 | Stop reason: HOSPADM

## 2025-08-27 RX ORDER — SODIUM CHLORIDE 0.9 % (FLUSH) 0.9 %
5-40 SYRINGE (ML) INJECTION EVERY 12 HOURS SCHEDULED
Status: DISCONTINUED | OUTPATIENT
Start: 2025-08-27 | End: 2025-08-27 | Stop reason: HOSPADM

## 2025-08-27 RX ORDER — SODIUM CHLORIDE 9 MG/ML
INJECTION, SOLUTION INTRAVENOUS PRN
Status: DISCONTINUED | OUTPATIENT
Start: 2025-08-27 | End: 2025-08-27 | Stop reason: HOSPADM

## 2025-08-27 RX ORDER — LIDOCAINE HYDROCHLORIDE 20 MG/ML
INJECTION, SOLUTION EPIDURAL; INFILTRATION; INTRACAUDAL; PERINEURAL
Status: DISCONTINUED | OUTPATIENT
Start: 2025-08-27 | End: 2025-08-27 | Stop reason: SDUPTHER

## 2025-08-27 RX ORDER — KETOROLAC TROMETHAMINE 30 MG/ML
INJECTION, SOLUTION INTRAMUSCULAR; INTRAVENOUS
Status: DISCONTINUED | OUTPATIENT
Start: 2025-08-27 | End: 2025-08-27 | Stop reason: SDUPTHER

## 2025-08-27 RX ORDER — MIDAZOLAM HYDROCHLORIDE 1 MG/ML
INJECTION, SOLUTION INTRAMUSCULAR; INTRAVENOUS
Status: DISCONTINUED | OUTPATIENT
Start: 2025-08-27 | End: 2025-08-27 | Stop reason: SDUPTHER

## 2025-08-27 RX ORDER — VASOPRESSIN 20 [USP'U]/ML
INJECTION, SOLUTION INTRAVENOUS PRN
Status: DISCONTINUED | OUTPATIENT
Start: 2025-08-27 | End: 2025-08-27 | Stop reason: ALTCHOICE

## 2025-08-27 RX ORDER — ONDANSETRON 2 MG/ML
4 INJECTION INTRAMUSCULAR; INTRAVENOUS
Status: DISCONTINUED | OUTPATIENT
Start: 2025-08-27 | End: 2025-08-27 | Stop reason: HOSPADM

## 2025-08-27 RX ORDER — ONDANSETRON 2 MG/ML
INJECTION INTRAMUSCULAR; INTRAVENOUS
Status: DISCONTINUED | OUTPATIENT
Start: 2025-08-27 | End: 2025-08-27 | Stop reason: SDUPTHER

## 2025-08-27 RX ORDER — FENTANYL CITRATE 50 UG/ML
INJECTION, SOLUTION INTRAMUSCULAR; INTRAVENOUS
Status: DISCONTINUED | OUTPATIENT
Start: 2025-08-27 | End: 2025-08-27 | Stop reason: SDUPTHER

## 2025-08-27 RX ORDER — FERRIC SUBSULFATE 20-22G/100
SOLUTION, NON-ORAL MISCELLANEOUS PRN
Status: DISCONTINUED | OUTPATIENT
Start: 2025-08-27 | End: 2025-08-27 | Stop reason: ALTCHOICE

## 2025-08-27 RX ORDER — PROPOFOL 10 MG/ML
INJECTION, EMULSION INTRAVENOUS
Status: DISCONTINUED | OUTPATIENT
Start: 2025-08-27 | End: 2025-08-27 | Stop reason: SDUPTHER

## 2025-08-27 RX ORDER — EPHEDRINE SULFATE 5 MG/ML
INJECTION INTRAVENOUS
Status: DISCONTINUED | OUTPATIENT
Start: 2025-08-27 | End: 2025-08-27 | Stop reason: SDUPTHER

## 2025-08-27 RX ORDER — SODIUM CHLORIDE 0.9 % (FLUSH) 0.9 %
5-40 SYRINGE (ML) INJECTION PRN
Status: DISCONTINUED | OUTPATIENT
Start: 2025-08-27 | End: 2025-08-27 | Stop reason: HOSPADM

## 2025-08-27 RX ORDER — PROCHLORPERAZINE EDISYLATE 5 MG/ML
5 INJECTION INTRAMUSCULAR; INTRAVENOUS
Status: DISCONTINUED | OUTPATIENT
Start: 2025-08-27 | End: 2025-08-27 | Stop reason: HOSPADM

## 2025-08-27 RX ORDER — GLYCOPYRROLATE 0.2 MG/ML
INJECTION INTRAMUSCULAR; INTRAVENOUS
Status: DISCONTINUED | OUTPATIENT
Start: 2025-08-27 | End: 2025-08-27 | Stop reason: SDUPTHER

## 2025-08-27 RX ORDER — OXYCODONE HYDROCHLORIDE 5 MG/1
5 TABLET ORAL
Status: COMPLETED | OUTPATIENT
Start: 2025-08-27 | End: 2025-08-27

## 2025-08-27 RX ADMIN — DEXAMETHASONE SODIUM PHOSPHATE 10 MG: 10 INJECTION INTRAMUSCULAR; INTRAVENOUS at 12:57

## 2025-08-27 RX ADMIN — KETOROLAC TROMETHAMINE 30 MG: 30 INJECTION, SOLUTION INTRAMUSCULAR; INTRAVENOUS at 13:17

## 2025-08-27 RX ADMIN — EPHEDRINE SULFATE 5 MG: 5 INJECTION INTRAVENOUS at 13:09

## 2025-08-27 RX ADMIN — SODIUM CHLORIDE, SODIUM LACTATE, POTASSIUM CHLORIDE, AND CALCIUM CHLORIDE: .6; .31; .03; .02 INJECTION, SOLUTION INTRAVENOUS at 10:35

## 2025-08-27 RX ADMIN — FENTANYL CITRATE 50 MCG: 50 INJECTION, SOLUTION INTRAMUSCULAR; INTRAVENOUS at 12:52

## 2025-08-27 RX ADMIN — LIDOCAINE HYDROCHLORIDE 100 MG: 20 INJECTION, SOLUTION EPIDURAL; INFILTRATION; INTRACAUDAL; PERINEURAL at 12:52

## 2025-08-27 RX ADMIN — ONDANSETRON 4 MG: 2 INJECTION, SOLUTION INTRAMUSCULAR; INTRAVENOUS at 12:57

## 2025-08-27 RX ADMIN — Medication 2000 MG: at 12:54

## 2025-08-27 RX ADMIN — MIDAZOLAM 2 MG: 1 INJECTION INTRAMUSCULAR; INTRAVENOUS at 12:43

## 2025-08-27 RX ADMIN — GLYCOPYRROLATE 0.2 MG: 0.2 INJECTION INTRAMUSCULAR; INTRAVENOUS at 13:13

## 2025-08-27 RX ADMIN — PROPOFOL 200 MG: 10 INJECTION, EMULSION INTRAVENOUS at 12:52

## 2025-08-27 RX ADMIN — FENTANYL CITRATE 50 MCG: 50 INJECTION, SOLUTION INTRAMUSCULAR; INTRAVENOUS at 13:11

## 2025-08-27 RX ADMIN — ACETAMINOPHEN 1000 MG: 500 TABLET, FILM COATED ORAL at 10:28

## 2025-08-27 RX ADMIN — OXYCODONE HYDROCHLORIDE 5 MG: 5 TABLET ORAL at 14:11

## 2025-08-27 ASSESSMENT — PAIN SCALES - GENERAL
PAINLEVEL_OUTOF10: 5
PAINLEVEL_OUTOF10: 5
PAINLEVEL_OUTOF10: 0

## 2025-08-27 ASSESSMENT — PAIN - FUNCTIONAL ASSESSMENT
PAIN_FUNCTIONAL_ASSESSMENT: 0-10
PAIN_FUNCTIONAL_ASSESSMENT: 0-10

## 2025-08-27 ASSESSMENT — PAIN DESCRIPTION - LOCATION: LOCATION: ABDOMEN

## 2025-08-28 ENCOUNTER — TELEPHONE (OUTPATIENT)
Dept: OBGYN CLINIC | Age: 45
End: 2025-08-28

## 2025-09-05 ENCOUNTER — HOSPITAL ENCOUNTER (EMERGENCY)
Age: 45
Discharge: ANOTHER HOSPITAL | End: 2025-09-05
Payer: MEDICAID

## 2025-09-05 VITALS
DIASTOLIC BLOOD PRESSURE: 79 MMHG | SYSTOLIC BLOOD PRESSURE: 114 MMHG | HEIGHT: 65 IN | BODY MASS INDEX: 34.16 KG/M2 | HEART RATE: 72 BPM | TEMPERATURE: 97.8 F | RESPIRATION RATE: 16 BRPM | OXYGEN SATURATION: 96 % | WEIGHT: 205 LBS

## 2025-09-05 DIAGNOSIS — N30.00 ACUTE CYSTITIS WITHOUT HEMATURIA: ICD-10-CM

## 2025-09-05 DIAGNOSIS — R10.30 LOWER ABDOMINAL PAIN: Primary | ICD-10-CM

## 2025-09-05 LAB
ALBUMIN SERPL-MCNC: 3.7 G/DL (ref 3.5–5)
ALBUMIN/GLOB SERPL: 1.1 (ref 1–1.9)
ALP SERPL-CCNC: 105 U/L (ref 35–104)
ALT SERPL-CCNC: 24 U/L (ref 8–45)
ANION GAP SERPL CALC-SCNC: 11 MMOL/L (ref 7–16)
AST SERPL-CCNC: 26 U/L (ref 15–37)
BACTERIA URNS QL MICRO: ABNORMAL /HPF
BASOPHILS # BLD: 0.05 K/UL (ref 0–0.2)
BASOPHILS NFR BLD: 0.4 % (ref 0–2)
BILIRUB SERPL-MCNC: <0.2 MG/DL (ref 0–1.2)
BILIRUB UR QL: NEGATIVE
BUN SERPL-MCNC: 15 MG/DL (ref 6–23)
CALCIUM SERPL-MCNC: 8.8 MG/DL (ref 8.8–10.2)
CASTS URNS QL MICRO: 0 /LPF
CHLORIDE SERPL-SCNC: 105 MMOL/L (ref 98–107)
CO2 SERPL-SCNC: 23 MMOL/L (ref 20–29)
CREAT SERPL-MCNC: 0.72 MG/DL (ref 0.6–1.1)
CRYSTALS URNS QL MICRO: 0 /LPF
DIFFERENTIAL METHOD BLD: ABNORMAL
EOSINOPHIL # BLD: 0.2 K/UL (ref 0–0.8)
EOSINOPHIL NFR BLD: 1.6 % (ref 0.5–7.8)
EPI CELLS #/AREA URNS HPF: ABNORMAL /HPF
ERYTHROCYTE [DISTWIDTH] IN BLOOD BY AUTOMATED COUNT: 14.2 % (ref 11.9–14.6)
GLOBULIN SER CALC-MCNC: 3.3 G/DL (ref 2.3–3.5)
GLUCOSE SERPL-MCNC: 97 MG/DL (ref 70–99)
GLUCOSE UR QL STRIP.AUTO: NEGATIVE MG/DL
HCG UR QL: NEGATIVE
HCT VFR BLD AUTO: 36.3 % (ref 35.8–46.3)
HGB BLD-MCNC: 12.2 G/DL (ref 11.7–15.4)
IMM GRANULOCYTES # BLD AUTO: 0.03 K/UL (ref 0–0.5)
IMM GRANULOCYTES NFR BLD AUTO: 0.2 % (ref 0–5)
KETONES UR-MCNC: NEGATIVE MG/DL
LACTATE SERPL-SCNC: 0.8 MMOL/L (ref 0.5–2)
LEUKOCYTE ESTERASE UR QL STRIP: ABNORMAL
LYMPHOCYTES # BLD: 3.27 K/UL (ref 0.5–4.6)
LYMPHOCYTES NFR BLD: 26.6 % (ref 13–44)
MCH RBC QN AUTO: 27 PG (ref 26.1–32.9)
MCHC RBC AUTO-ENTMCNC: 33.6 G/DL (ref 31.4–35)
MCV RBC AUTO: 80.3 FL (ref 82–102)
MONOCYTES # BLD: 0.84 K/UL (ref 0.1–1.3)
MONOCYTES NFR BLD: 6.8 % (ref 4–12)
MUCOUS THREADS URNS QL MICRO: 0 /LPF
NEUTS SEG # BLD: 7.91 K/UL (ref 1.7–8.2)
NEUTS SEG NFR BLD: 64.4 % (ref 43–78)
NITRITE UR QL: POSITIVE
NRBC # BLD: 0 K/UL (ref 0–0.2)
PH UR: 6 (ref 5–9)
PLATELET # BLD AUTO: 244 K/UL (ref 150–450)
PMV BLD AUTO: 10.8 FL (ref 9.4–12.3)
POTASSIUM SERPL-SCNC: 4.1 MMOL/L (ref 3.5–5.1)
PROT SERPL-MCNC: 7.1 G/DL (ref 6.3–8.2)
PROT UR QL: NEGATIVE MG/DL
RBC # BLD AUTO: 4.52 M/UL (ref 4.05–5.2)
RBC # UR STRIP: ABNORMAL
RBC #/AREA URNS HPF: ABNORMAL /HPF
SERVICE CMNT-IMP: ABNORMAL
SERVICE CMNT-IMP: NORMAL
SODIUM SERPL-SCNC: 139 MMOL/L (ref 136–145)
SP GR UR: 1.02 (ref 1–1.02)
UROBILINOGEN UR QL: 0.2 EU/DL (ref 0.2–1)
WBC # BLD AUTO: 12.3 K/UL (ref 4.3–11.1)
WBC URNS QL MICRO: ABNORMAL /HPF
WET PREP GENITAL: NORMAL
WET PREP GENITAL: NORMAL

## 2025-09-05 PROCEDURE — 85025 COMPLETE CBC W/AUTO DIFF WBC: CPT

## 2025-09-05 PROCEDURE — 6370000000 HC RX 637 (ALT 250 FOR IP)

## 2025-09-05 PROCEDURE — 83605 ASSAY OF LACTIC ACID: CPT

## 2025-09-05 PROCEDURE — 81001 URINALYSIS AUTO W/SCOPE: CPT

## 2025-09-05 PROCEDURE — 6360000002 HC RX W HCPCS

## 2025-09-05 PROCEDURE — 81025 URINE PREGNANCY TEST: CPT

## 2025-09-05 PROCEDURE — 80053 COMPREHEN METABOLIC PANEL: CPT

## 2025-09-05 PROCEDURE — 87210 SMEAR WET MOUNT SALINE/INK: CPT

## 2025-09-05 RX ORDER — ONDANSETRON 2 MG/ML
4 INJECTION INTRAMUSCULAR; INTRAVENOUS ONCE
Status: COMPLETED | OUTPATIENT
Start: 2025-09-05 | End: 2025-09-05

## 2025-09-05 RX ORDER — CEFPODOXIME PROXETIL 100 MG/1
100 TABLET, FILM COATED ORAL 2 TIMES DAILY
Qty: 14 TABLET | Refills: 0 | Status: SHIPPED | OUTPATIENT
Start: 2025-09-05 | End: 2025-09-12

## 2025-09-05 RX ORDER — ONDANSETRON 4 MG/1
4 TABLET, ORALLY DISINTEGRATING ORAL 3 TIMES DAILY PRN
Qty: 21 TABLET | Refills: 0 | Status: SHIPPED | OUTPATIENT
Start: 2025-09-05

## 2025-09-05 RX ORDER — KETOROLAC TROMETHAMINE 15 MG/ML
15 INJECTION, SOLUTION INTRAMUSCULAR; INTRAVENOUS ONCE
Status: COMPLETED | OUTPATIENT
Start: 2025-09-05 | End: 2025-09-05

## 2025-09-05 RX ORDER — HYOSCYAMINE SULFATE 0.12 MG/1
0.12 TABLET SUBLINGUAL
Status: COMPLETED | OUTPATIENT
Start: 2025-09-05 | End: 2025-09-05

## 2025-09-05 RX ADMIN — KETOROLAC TROMETHAMINE 15 MG: 15 INJECTION, SOLUTION INTRAMUSCULAR; INTRAVENOUS at 20:01

## 2025-09-05 RX ADMIN — HYOSCYAMINE SULFATE 0.12 MG: 0.12 TABLET ORAL; SUBLINGUAL at 20:01

## 2025-09-05 RX ADMIN — ONDANSETRON 4 MG: 2 INJECTION, SOLUTION INTRAMUSCULAR; INTRAVENOUS at 20:02

## 2025-09-05 ASSESSMENT — PAIN - FUNCTIONAL ASSESSMENT
PAIN_FUNCTIONAL_ASSESSMENT: 0-10
PAIN_FUNCTIONAL_ASSESSMENT: 0-10

## 2025-09-05 ASSESSMENT — PAIN SCALES - GENERAL
PAINLEVEL_OUTOF10: 8
PAINLEVEL_OUTOF10: 8

## 2025-09-05 ASSESSMENT — ENCOUNTER SYMPTOMS
ALLERGIC/IMMUNOLOGIC NEGATIVE: 1
RESPIRATORY NEGATIVE: 1
ABDOMINAL PAIN: 1
EYES NEGATIVE: 1

## 2025-09-05 ASSESSMENT — PAIN DESCRIPTION - LOCATION: LOCATION: ABDOMEN

## 2025-09-05 ASSESSMENT — PAIN DESCRIPTION - ORIENTATION: ORIENTATION: LOWER

## 2025-09-05 ASSESSMENT — LIFESTYLE VARIABLES
HOW MANY STANDARD DRINKS CONTAINING ALCOHOL DO YOU HAVE ON A TYPICAL DAY: PATIENT DOES NOT DRINK
HOW OFTEN DO YOU HAVE A DRINK CONTAINING ALCOHOL: NEVER

## (undated) DEVICE — DRAPE UNDERBUTTOCK W33.5XL46IN STD N FEN REINF W/ FLD PCH

## (undated) DEVICE — SOLUTION IRRIG 1000ML STRL H2O USP PLAS POUR BTL

## (undated) DEVICE — NEEDLE SPNL 22GA L3.5IN BLK HUB S STL REG WALL FIT STYL

## (undated) DEVICE — ELECTRODE BALL DIA3MM TUNGSTEN LLETZ DURABLE RESIST

## (undated) DEVICE — Device

## (undated) DEVICE — SOLUTION SCRB 0.04 CHG DYNA HEX

## (undated) DEVICE — NEEDLE HYPO 18GA L1.5IN PNK S STL HUB POLYPR SHLD REG BVL

## (undated) DEVICE — PAD N ADH W3XL8IN POLY COT SFT PERF FLM EASILY CUT ABSRB

## (undated) DEVICE — GLOVE ORANGE PI 7 1/2   MSG9075